# Patient Record
Sex: FEMALE | Race: WHITE | NOT HISPANIC OR LATINO | Employment: OTHER | URBAN - METROPOLITAN AREA
[De-identification: names, ages, dates, MRNs, and addresses within clinical notes are randomized per-mention and may not be internally consistent; named-entity substitution may affect disease eponyms.]

---

## 2017-05-15 DIAGNOSIS — N64.89 OTHER SPECIFIED DISORDERS OF BREAST: ICD-10-CM

## 2017-06-08 ENCOUNTER — HOSPITAL ENCOUNTER (OUTPATIENT)
Dept: ULTRASOUND IMAGING | Facility: CLINIC | Age: 52
Discharge: HOME | End: 2017-06-08
Payer: COMMERCIAL

## 2017-06-08 ENCOUNTER — HOSPITAL ENCOUNTER (OUTPATIENT)
Dept: MAMMOGRAPHY | Facility: CLINIC | Age: 52
Discharge: HOME | End: 2017-06-08
Payer: COMMERCIAL

## 2017-06-08 DIAGNOSIS — N64.89 OTHER SPECIFIED DISORDERS OF BREAST: ICD-10-CM

## 2017-06-08 PROCEDURE — 77063 BREAST TOMOSYNTHESIS BI: CPT

## 2017-06-08 PROCEDURE — G0202 SCR MAMMO BI INCL CAD: HCPCS

## 2017-06-08 PROCEDURE — 76642 ULTRASOUND BREAST LIMITED: CPT

## 2017-06-11 ENCOUNTER — GENERIC CONVERSION - ENCOUNTER (OUTPATIENT)
Dept: OTHER | Facility: OTHER | Age: 52
End: 2017-06-11

## 2017-06-14 ENCOUNTER — ALLSCRIPTS OFFICE VISIT (OUTPATIENT)
Dept: OTHER | Facility: OTHER | Age: 52
End: 2017-06-14

## 2017-07-12 ENCOUNTER — GENERIC CONVERSION - ENCOUNTER (OUTPATIENT)
Dept: OTHER | Facility: OTHER | Age: 52
End: 2017-07-12

## 2017-07-18 DIAGNOSIS — N83.202 CYST OF LEFT OVARY: ICD-10-CM

## 2017-07-19 LAB — PLEASE NOTE (HISTORICAL): NORMAL

## 2017-07-20 ENCOUNTER — GENERIC CONVERSION - ENCOUNTER (OUTPATIENT)
Dept: OTHER | Facility: OTHER | Age: 52
End: 2017-07-20

## 2017-07-20 LAB — FECAL OCCULT BLOOD DIAGNOSTIC (HISTORICAL): NEGATIVE

## 2017-09-12 ENCOUNTER — ALLSCRIPTS OFFICE VISIT (OUTPATIENT)
Dept: OTHER | Facility: OTHER | Age: 52
End: 2017-09-12

## 2017-09-12 ENCOUNTER — LAB REQUISITION (OUTPATIENT)
Dept: LAB | Facility: HOSPITAL | Age: 52
End: 2017-09-12
Payer: COMMERCIAL

## 2017-09-12 DIAGNOSIS — Z01.419 ENCOUNTER FOR GYNECOLOGICAL EXAMINATION WITHOUT ABNORMAL FINDING: ICD-10-CM

## 2017-09-12 PROCEDURE — G0145 SCR C/V CYTO,THINLAYER,RESCR: HCPCS | Performed by: OBSTETRICS & GYNECOLOGY

## 2017-09-12 PROCEDURE — 87624 HPV HI-RISK TYP POOLED RSLT: CPT | Performed by: OBSTETRICS & GYNECOLOGY

## 2017-09-18 LAB — HPV RRNA GENITAL QL NAA+PROBE: NORMAL

## 2017-09-20 LAB
LAB AP GYN PRIMARY INTERPRETATION: NORMAL
Lab: NORMAL

## 2017-10-26 NOTE — PROGRESS NOTES
Assessment  1  Encounter for annual routine gynecological examination (V72 31) (Z37 570)    Discussion/Summary    #1  Pap smear done with HPV  Patient will call for results in one weekEncouraged self breast examination as well as calcium supplementationContinue annual mammogram, 3-D as well as right breast ultrasound in one year  Patient will check cc of 3-D component is covered by her insuranceReturn to office in one year  The patient has the current Goals: Goals met  The patent has the current Barriers: No barriers  Patient is able to Self-Care  Self Referrals: Yes      Chief Complaint  yearly      History of Present Illness  HPI: This is a 64-year-old female  who presents for her annual GYN exam  She went through menopause at age 51  She denies any vaginal bleeding or spotting  She denies any hot flashes or night sweats  She was never on hormone replacement therapy  She did have an abnormal Pap smear, GIGI and subsequently underwent a D&C hysteroscopy in 2016  Workup was negative  Patient denies any vaginal bleeding or spotting patient is sexually active and has been in a monogamous relationship for 27 years  is up-to-date with her screening mammogram with recommendations to have follow-up mammogram one year with breast ultrasound  She's never had a screening colonoscopy          Review of Systems    Cardiovascular: no complaints of slow or fast heart rate, no chest pain, no palpitations, no leg claudication or lower extremity edema  Respiratory: no complaints of shortness of breath, no wheezing, no dyspnea on exertion, no orthopnea or PND  Breasts: no complaints of breast pain, breast lump or nipple discharge  Gastrointestinal: no complaints of abdominal pain, no constipation, no nausea or diarrhea, no vomiting, no bloody stools  Genitourinary: no complaints of dysuria, no incontinence, no pelvic pain, no dysmenorrhea, no vaginal discharge or abnormal vaginal bleeding       Over the past 2 weeks, how often have you been bothered by the following problems? 1 ) Little interest or pleasure in doing things? Not at all    2 ) Feeling down, depressed or hopeless? Not at all    3 ) Trouble falling asleep or sleeping too much? Not at all    4 ) Feeling tired or having little energy? Not at all    5 ) Poor appetite or overeating? Not at all    6 ) Feeling bad about yourself, or that you are a failure, or have let yourself or your family down? Not at all    7 ) Trouble concentrating on things, such as reading a newspaper or watching television? Not at all    8 ) Moving or speaking so slowly that other people could have noticed, or the opposite, moving or speaking faster than usual? Not at all    9 ) Thoughts that you would be better off dead or of hurting yourself in some way? Not at all  Score 0     ROS reviewed  OB History  Pregnancy History (Brief):   Prior pregnancies: : 3  Para:   Delivery type: 2 vaginal,-- 1  section       Active Problems  1  Breast asymmetry (611 89) (N64 89)   2  Breast density (611 79) (R92 2)   3  Encounter for annual routine gynecological examination (V72 31) (Z01 419)   4  Encounter for screening mammogram for malignant neoplasm of breast (V76 12)   (Z12 31)   5  Ovarian cyst, left (620 2) (N83 202)   6  Tick bite, initial encounter (919 4,E906 4) (W57 XXXA)    Past Medical History   · History of Fracture Of The Humerus (812 20)   · History of Fracture Of The Nasal Bones (802 0)   · History of metrorrhagia (V13 29) (Z87 42)   · History of Neuritis (729 2) (M79 2)   · History of Subserous leiomyoma of uterus (218 2) (D25 2)    The active problems and past medical history were reviewed and updated today  Surgical History   · History of  Section    The surgical history was reviewed and updated today         Family History  Maternal Grandfather    · Family history of Neoplasm Of The Bone Marrow  Paternal Grandfather    · Family history of Colon Cancer (V16 0)    The family history was reviewed and updated today  Social History   · Being A Social Drinker   · Daily Coffee Consumption (3  Cups/Day)   · Never a smoker  The social history was reviewed and updated today  Current Meds   1  No Reported Medications Recorded    Allergies  1  No Known Drug Allergies    Vitals   Recorded: 44UTX4249 08:91VA   Systolic 862   Diastolic 70   Height 5 ft 4 5 in   Weight 154 lb    BMI Calculated 26 03   BSA Calculated 1 76   LMP postmenopausal     Physical Exam    Constitutional   General appearance: No acute distress, well appearing and well nourished  Pulmonary   Auscultation of lungs: Clear to auscultation  Cardiovascular   Auscultation of heart: Normal rate and rhythm, normal S1 and S2, no murmurs  Genitourinary   External genitalia: Normal and no lesions appreciated  Vagina: Abnormal   Vagina: atrophy  Urethral meatus: Normal     Cervix: Normal, no palpable masses  Uterus: Normal, non-tender, not enlarged, and no palpable masses  Adnexa/parametria: Normal, non-tender and no fullness or masses appreciated  Anus, perineum, and rectum: Normal sphincter tone, no masses, and no prolapse  Chest   Breasts: Normal and no dimpling or skin changes noted  Abdomen   Abdomen: Normal, non-tender, and no organomegaly noted         Signatures   Electronically signed by : Joanne Velasquez DO; Sep 12 2017  4:01PM EST                       (Author)

## 2018-01-11 NOTE — RESULT NOTES
Verified Results  [address] [facility] & CAD 08Jun2017 02:44PM Ronald Beltran Order Number: [id number]    - Patient Instructions: To schedule this appointment, please contact Central Scheduling at [phone]  Do not wear any perfume, powder, lotion or deodorant on breast or underarm area  Please bring your doctors order, referral (if needed) and insurance information with you on the day of the test  Failure to bring this information may result in this test being rescheduled  Arrive 15 minutes prior to your appointment time to register  On the day of your test, please bring any prior mammogram or breast studies with you that were not performed at a [facility]  Failure to bring prior exams may result in your test needing to be rescheduled  Test Name Result Flag Reference   MAMMO SCREENING BILATERAL W 3D & CAD (Report)     Patient History:   Patient is postmenopausal    Family history of colorectal cancer at age 79 in paternal    grandfather  Patient is a former smoker, and smoked for 10 years  Patient's    BMI is 25 1  Reason for exam: screening, asymptomatic  Mammo Screening Bilateral W DBT and CAD: June 8, 2017 - Check In    #: [de-identified]   2D/3D Procedure   3D views: Bilateral MLO view(s) were taken  2D views: Bilateral CC view(s) were taken  Technologist: MARLEY Ornelas (R)(M)   Prior study comparison: June 6, 2016, mammo diagnostic right W    CAD performed at [address] [place] [address]  June 6, 2016, US breast right limited performed at [address] [place] [address]  Cassia 3, 2016, mammo screening bilateral W CAD,    performed at [address] [facility]  March 11, 2014, bilateral OPMA digital scrn mammo w/CAD, performed at    [address] [facility]  US Breast Right Limited: June 8, 2017 - Check In #: [de-identified]   Standard views       Technologist: RT Bree (R), RDMS   FINDINGS: 3-D tomographic MLO and 2-D cc images of both breasts    were obtained  The partially obscured oval tissue density seen    previously in the outer anterior RIGHT cc view is present but    much less apparent than on the prior studies  No discrete    abnormality is identified on the tomographic images through this    region  There is no evidence for dominant mass, suspicious    microcalcification or distortion  Remainder of the breast is    otherwise stable  Targeted ultrasound of RIGHT 10-11 o'clock region was performed    with a high frequency linear transducer  The 2 previously    described nodular areas are again identified, remaining present    and overall stable in appearance and size compared to the prior  Specifically, 11:00 2 cm position 7 x 4 x 6 mm and between 10    and 11 o'clock position 2 cm from the nipple measuring 6 x 4 x 5    mm  On volumetric imaging these have an appearance more    suggestive of a cyst cluster  CONCLUSION: Stable probably benign findings as detailed above  Recommend repeat ultrasound in one year at the time of bilateral    mammography  ACR BI-RADSï¾® Assessments: BiRad:3 - Probably Benign (Overall)   3-D Scrn Mammo: BiRad:2 - benign finding in both breasts  Right breast Right Brst US: BiRad:3 - probably benign finding in    the right breast      Recommendation:   Mammogram of both breasts in 1 year  Ultrasound of the right breast in 12 months       Transcription Location: [facility] 98: [id number]     Risk Value(s):   Tyrer-Cuzick 10 Year: 2 300%, Tyrer-Cuzick Lifetime: 10 000%,    Myriad Table: 1 5%, SKY 5 Year: 1 3%, NCI Lifetime: 12 1%

## 2018-01-13 NOTE — RESULT NOTES
Verified Results  *US BREAST RIGHT LIMITED (DIAGNOSTIC) [id number] 02:44PM Butch Nagy Order Number: [id number]   Performing Comments: was due for 6 month f/u (R) breast asymmetry 12/2016   - Patient Instructions: To schedule this appointment, please contact Central Scheduling at [phone]  Test Name Result Flag Reference   US BREAST RIGHT LIMITED (Report)     Patient History:   Patient is postmenopausal    Family history of colorectal cancer at age 79 in paternal    grandfather  Patient is a former smoker, and smoked for 10 years  Patient's    BMI is 25 1  Reason for exam: screening, asymptomatic  Mammo Screening Bilateral W DBT and CAD: June 8, 2017 - Check In    #: [de-identified]   2D/3D Procedure   3D views: Bilateral MLO view(s) were taken  2D views: Bilateral CC view(s) were taken  Technologist: MARLEY Stratton Ala (R)(M)   Prior study comparison: June 6, 2016, mammo diagnostic right W    CAD performed at [address] [place] [address]  June 6, 2016, US breast right limited performed at [address] [place] [address]  Cassia 3, 2016, mammo screening bilateral W CAD,    performed at [address] [facility]  March 11, 2014, bilateral OPMA digital scrn mammo w/CAD, performed at    [address] [facility]  US Breast Right Limited: June 8, 2017 - Check In #: [de-identified]   Standard views  Technologist: RT Davi (R), HARSHA   FINDINGS: 3-D tomographic MLO and 2-D cc images of both breasts    were obtained  The partially obscured oval tissue density seen    previously in the outer anterior RIGHT cc view is present but    much less apparent than on the prior studies  No discrete    abnormality is identified on the tomographic images through this    region  There is no evidence for dominant mass, suspicious    microcalcification or distortion  Remainder of the breast is    otherwise stable       Targeted ultrasound of RIGHT 10-11 o'clock region was performed    with a high frequency linear transducer  The 2 previously    described nodular areas are again identified, remaining present    and overall stable in appearance and size compared to the prior  Specifically, 11:00 2 cm position 7 x 4 x 6 mm and between 10    and 11 o'clock position 2 cm from the nipple measuring 6 x 4 x 5    mm  On volumetric imaging these have an appearance more    suggestive of a cyst cluster  CONCLUSION: Stable probably benign findings as detailed above  Recommend repeat ultrasound in one year at the time of bilateral    mammography  ACR BI-RADSï¾® Assessments: BiRad:3 - Probably Benign (Overall)   3-D Scrn Mammo: BiRad:2 - benign finding in both breasts  Right breast Right Brst US: BiRad:3 - probably benign finding in    the right breast      Recommendation:   Mammogram of both breasts in 1 year  Ultrasound of the right breast in 12 months       Transcription Location: [facility] 98: [id number]     Risk Value(s):   Tyrer-Cuzick 10 Year: 2 300%, Tyrer-Cuzick Lifetime: 10 000%,    Myriad Table: 1 5%, SKY 5 Year: 1 3%, NCI Lifetime: 12 1%

## 2018-01-14 VITALS
OXYGEN SATURATION: 98 % | BODY MASS INDEX: 25.7 KG/M2 | HEART RATE: 64 BPM | DIASTOLIC BLOOD PRESSURE: 66 MMHG | TEMPERATURE: 97.6 F | SYSTOLIC BLOOD PRESSURE: 108 MMHG | HEIGHT: 65 IN | WEIGHT: 154.25 LBS

## 2018-01-15 VITALS
DIASTOLIC BLOOD PRESSURE: 70 MMHG | HEIGHT: 65 IN | SYSTOLIC BLOOD PRESSURE: 110 MMHG | WEIGHT: 154 LBS | BODY MASS INDEX: 25.66 KG/M2

## 2018-01-17 NOTE — RESULT NOTES
Discussion/Summary   Please let the patient know that her stool test was negative  She will need to repeat this in one year for colon cancer screening purposes       Verified Results  (LC) Occult Blood, Fecal, [place] [id number] 12:00AM Latrell Jasmine     Test Name Result Flag Reference   Occult Blood, Fecal, [place] Negative  Negative

## 2018-01-23 NOTE — PROGRESS NOTES
Assessment   1  Encounter for preventive health examination (V70 0) (Z00 00)  2  Encounter for PPD test (V74 1) (Z11 1)  3  Screening for colon cancer (V76 51) (Z12 11)  4  Screening for tuberculosis (V74 1) (Z11 1)  5  Never a smoker    Plan  Health Maintenance    · Always use a seat belt and shoulder strap when riding or driving a motor vehicle ;  Status:Complete;   Done: 23MVB4139   · Brush your teeth 3 times a day and floss at least once a day ; Status:Complete;   Done:  45TWP3102   · Drink plenty of fluids ; Status:Complete;   Done: 30MRV8647   · Eat foods that are high in calcium ; Status:Complete;   Done: 18VSR5113   · Schedule an appointment in 48-72 hours to have your TB (tuberculin) skin test  interpreted by a trained healthcare provider ; Status:Complete;   Done: 65HAV3906   · Use a sun block product with an SPF of 15 or more ; Status:Complete;   Done:  39SEV4632   · We encourage all of our patients to exercise regularly  30 minutes of exercise or physical  activity five or more days a week is recommended for children and adults ;  Status:Complete;   Done: 75TZQ7280   · We recommend routine visits to a dentist ; Status:Complete;   Done: 41UEG8323   · We recommend that you follow the "Mediterranean diet "; Status:Complete;   Done:  13XJP0147   · Call [phone] if: You find a new or different kind of lump in your breast ;  Status:Complete;   Done: 27XMQ5886   · Call [phone] if: You have any warning signs of skin cancer ; Status:Complete;    Done: 22ETQ5888  Screening for colon cancer    · COLONOSCOPY; Status:Active; Requested for:30Nov2016;   Screening for tuberculosis    · Administered: Tubersol 5 UNIT/0 1ML Intradermal Solution    Discussion/Summary  health maintenance visit healthy adult female Currently, she eats a healthy diet and has an adequate exercise regimen  cervical cancer screening is current Breast cancer screening: mammogram is current   Colorectal cancer screening: the next colonoscopy is due november 2016 and colorectal cancer screening is not indicated  Osteoporosis screening: bone mineral density testing is not indicated  The immunizations are up to date  Patient discussion: discussed with the patient  1) ppd done today, check on monday  2) keep appt for gyn evaluation   3) colonoscopy due at age 49  Chief Complaint  53YEAR OLD FEMALE PATIENT FOR A WORK PE  SHE FORGOT HER FORM TODAY  HER SON IS GOING TO DRIVE THIS OVER TODAY  EYE EXAM COMPLETE  PATIENT TAKES NO MEDICATIONS  ADACEL, BW AND MAMMO UP TO DATE  PATIENT IS SCHEDULED WITH DR Audrey Claire TO HAVE A D/C ON 0812/16  SHE WILL HAVE HER FORWARD SURGICAL RECORDS  History of Present Illness  HM, Adult Female: The patient is being seen for a health maintenance evaluation  The last health maintenance visit was 2 year(s) ago  General Health: The patient's health since the last visit is described as good  She has regular dental visits  She denies vision problems  She denies hearing loss  Immunizations status: up to date  Lifestyle:  She consumes a diverse and healthy diet  She does not have any weight concerns  She exercises regularly  She does not use tobacco  She consumes alcohol  She denies drug use  Reproductive health: the patient is perimenopausal    Screening: cancer screening reviewed and updated  metabolic screening reviewed and updated  risk screening reviewed and updated  HPI: 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[employer]  She recently spent 6 weeks in [place] she will need a TB test today  She denies any recent illnesses  Review of Systems    Constitutional: No fever, no chills, feels well, no tiredness, no recent weight gain or weight loss  Eyes: No complaints of eye pain, no red eyes, no eyesight problems, no discharge, no dry eyes, no itching of eyes     ENT: no complaints of earache, no loss of hearing, no nose bleeds, no nasal discharge, no sore throat, no hoarseness  Cardiovascular: No complaints of slow heart rate, no fast heart rate, no chest pain, no palpitations, no leg claudication, no lower extremity edema  Respiratory: No complaints of shortness of breath, no wheezing, no cough, no SOB on exertion, no orthopnea, no PND  Gastrointestinal: No complaints of abdominal pain, no constipation, no nausea or vomiting, no diarrhea, no bloody stools  Genitourinary: No complaints of dysuria, no incontinence, no pelvic pain, no dysmenorrhea, no vaginal discharge or bleeding  Musculoskeletal: No complaints of arthralgias, no myalgias, no joint swelling or stiffness, no limb pain or swelling  Integumentary: No complaints of skin rash or lesions, no itching, no skin wounds, no breast pain or lump  Neurological: No complaints of headache, no confusion, no convulsions, no numbness, no dizziness or fainting, no tingling, no limb weakness, no difficulty walking  Psychiatric: Not suicidal, no sleep disturbance, no anxiety or depression, no change in personality, no emotional problems  Endocrine: No complaints of proptosis, no hot flashes, no muscle weakness, no deepening of the voice, no feelings of weakness  Hematologic/Lymphatic: No complaints of swollen glands, no swollen glands in the neck, does not bleed easily, does not bruise easily  Active Problems   1  Anemia (285 9) (D64 9)  2  Encounter for PPD test (V74 1) (Z11 1)  3  Encounter for screening mammogram for malignant neoplasm of breast (V76 12)   (Z12 31)  4  Migraine headache (346 90) (G43 909)  5  Need for immunization against influenza (V04 81) (Z23)  6  Screening for diabetes mellitus (DM) (V77 1) (Z13 1)  7   Screening for lipoid disorders (V77 91) (Z13 220)    Past Medical History    · History of Conjunctivitis (372 30) (H10 9)   · History of Fracture Of The Humerus (812 20)   · History of Fracture Of The Nasal Bones (802 0)   · History of metrorrhagia (V13 29) (Z87 42)   · History of Neuritis (729 2) (M79 2)   · History of Vaginal candidiasis (112 1) (B37 3)    Surgical History    · History of  Section    Family History  Maternal Grandfather    · Family history of Neoplasm Of The Bone Marrow  Paternal Grandfather    · Family history of Colon Cancer (V16 0)    Social History    · Being A Social Drinker   · Daily Coffee Consumption (3  Cups/Day)   · Never a smoker    Allergies   1  No Known Drug Allergies    Vitals   Recorded: 76IJJ4664 16:41MU   Systolic 171   Diastolic 64   Heart Rate 66   Temperature 96 5 F   O2 Saturation 99   Height 5 ft 4 5 in   Weight 145 lb 4 00 oz   BMI Calculated 24 55   BSA Calculated 1 72     Physical Exam    Constitutional   General appearance: No acute distress, well appearing and well nourished  Head and Face   Head and face: Normal     Palpation of the face and sinuses: No sinus tenderness  Eyes   Conjunctiva and lids: No swelling, erythema or discharge  Pupils and irises: Equal, round, reactive to light  Ears, Nose, Mouth, and Throat   External inspection of ears and nose: Normal     Otoscopic examination: Tympanic membranes translucent with normal light reflex  Canals patent without erythema  Hearing: Normal     Nasal mucosa, septum, and turbinates: Normal without edema or erythema  Lips, teeth, and gums: Normal, good dentition  Oropharynx: Normal with no erythema, edema, exudate or lesions  Neck   Neck: Supple, symmetric, trachea midline, no masses  Thyroid: Normal, no thyromegaly  Pulmonary   Respiratory effort: No increased work of breathing or signs of respiratory distress  Auscultation of lungs: Clear to auscultation  Cardiovascular   Auscultation of heart: Normal rate and rhythm, normal S1 and S2, no murmurs  Examination of extremities for edema and/or varicosities: Normal     Abdomen   Abdomen: Non-tender, no masses  Liver and spleen: No hepatomegaly or splenomegaly      Lymphatic   Palpation of lymph nodes in neck: No lymphadenopathy  Musculoskeletal   Gait and station: Normal     Digits and nails: Normal without clubbing or cyanosis  Joints, bones, and muscles: Normal     Range of motion: Normal     Stability: Normal     Muscle strength/tone: Normal     Skin   Skin and subcutaneous tissue: Normal without rashes or lesions  Palpation of skin and subcutaneous tissue: Normal turgor  Neurologic   Cortical function: Normal mental status  Coordination: Normal finger to nose and heel to shin  Psychiatric   Judgment and insight: Normal     Orientation to person, place, and time: Normal     Recent and remote memory: Intact  Mood and affect: Normal        Procedure    Procedure:1  Visual Acuity Test1   Inforrmation supplied by 1  a Snellen chart1   Results:1  20/20 in both eyes without corrective device1 , 20/20 in the right eye without corrective device1 , 20/20 in the left eye without corrective device1        1 Amended By: Luca Adames;  Aug 08 2016 11:37 AM EST    Future Appointments    Date/Time Provider Specialty Site   08/12/2016 07:30 AM Chidi Elise DO Obstetrics/Gynecology [facility] [place]     Signatures   Electronically signed by : Joel Bills DO; Aug  8 2016  1:48PM EST                       (Author)

## 2018-06-08 DIAGNOSIS — R92.2 INCONCLUSIVE MAMMOGRAM: ICD-10-CM

## 2018-06-08 DIAGNOSIS — Z12.31 ENCOUNTER FOR SCREENING MAMMOGRAM FOR MALIGNANT NEOPLASM OF BREAST: ICD-10-CM

## 2019-06-17 ENCOUNTER — ANNUAL EXAM (OUTPATIENT)
Dept: OBGYN CLINIC | Facility: CLINIC | Age: 54
End: 2019-06-17
Payer: COMMERCIAL

## 2019-06-17 VITALS
DIASTOLIC BLOOD PRESSURE: 80 MMHG | HEIGHT: 64 IN | BODY MASS INDEX: 25.57 KG/M2 | WEIGHT: 149.8 LBS | SYSTOLIC BLOOD PRESSURE: 114 MMHG

## 2019-06-17 DIAGNOSIS — Z12.39 BREAST CANCER SCREENING: ICD-10-CM

## 2019-06-17 DIAGNOSIS — Z01.419 ENCOUNTER FOR ANNUAL ROUTINE GYNECOLOGICAL EXAMINATION: Primary | ICD-10-CM

## 2019-06-17 PROCEDURE — 87624 HPV HI-RISK TYP POOLED RSLT: CPT | Performed by: OBSTETRICS & GYNECOLOGY

## 2019-06-17 PROCEDURE — G0145 SCR C/V CYTO,THINLAYER,RESCR: HCPCS | Performed by: OBSTETRICS & GYNECOLOGY

## 2019-06-17 PROCEDURE — S0612 ANNUAL GYNECOLOGICAL EXAMINA: HCPCS | Performed by: OBSTETRICS & GYNECOLOGY

## 2019-06-19 ENCOUNTER — HOSPITAL ENCOUNTER (OUTPATIENT)
Dept: MAMMOGRAPHY | Facility: CLINIC | Age: 54
Discharge: HOME | End: 2019-06-19
Payer: COMMERCIAL

## 2019-06-19 VITALS — WEIGHT: 150 LBS | HEIGHT: 64 IN | BODY MASS INDEX: 25.61 KG/M2

## 2019-06-19 DIAGNOSIS — Z12.39 BREAST CANCER SCREENING: ICD-10-CM

## 2019-06-19 PROCEDURE — 77063 BREAST TOMOSYNTHESIS BI: CPT

## 2019-06-19 PROCEDURE — 77067 SCR MAMMO BI INCL CAD: CPT

## 2019-06-20 LAB
HPV HR 12 DNA CVX QL NAA+PROBE: NEGATIVE
HPV16 DNA CVX QL NAA+PROBE: NEGATIVE
HPV18 DNA CVX QL NAA+PROBE: NEGATIVE

## 2019-06-21 LAB
LAB AP GYN PRIMARY INTERPRETATION: NORMAL
Lab: NORMAL

## 2019-10-30 ENCOUNTER — TELEPHONE (OUTPATIENT)
Dept: FAMILY MEDICINE CLINIC | Facility: CLINIC | Age: 54
End: 2019-10-30

## 2020-01-02 ENCOUNTER — TELEPHONE (OUTPATIENT)
Dept: FAMILY MEDICINE CLINIC | Facility: CLINIC | Age: 55
End: 2020-01-02

## 2020-10-28 ENCOUNTER — OFFICE VISIT (OUTPATIENT)
Dept: FAMILY MEDICINE CLINIC | Facility: CLINIC | Age: 55
End: 2020-10-28
Payer: COMMERCIAL

## 2020-10-28 VITALS
HEIGHT: 66 IN | OXYGEN SATURATION: 98 % | DIASTOLIC BLOOD PRESSURE: 82 MMHG | BODY MASS INDEX: 23.63 KG/M2 | SYSTOLIC BLOOD PRESSURE: 120 MMHG | HEART RATE: 66 BPM | WEIGHT: 147 LBS | TEMPERATURE: 97.6 F

## 2020-10-28 DIAGNOSIS — Z12.11 SCREENING FOR COLORECTAL CANCER: ICD-10-CM

## 2020-10-28 DIAGNOSIS — Z13.0 SCREENING FOR ENDOCRINE, METABOLIC AND IMMUNITY DISORDER: ICD-10-CM

## 2020-10-28 DIAGNOSIS — Z13.29 SCREENING FOR ENDOCRINE, METABOLIC AND IMMUNITY DISORDER: ICD-10-CM

## 2020-10-28 DIAGNOSIS — T14.8XXA HEMATOMA: ICD-10-CM

## 2020-10-28 DIAGNOSIS — Z13.29 SCREENING FOR THYROID DISORDER: ICD-10-CM

## 2020-10-28 DIAGNOSIS — Z13.0 SCREENING FOR IRON DEFICIENCY ANEMIA: ICD-10-CM

## 2020-10-28 DIAGNOSIS — Z13.228 SCREENING FOR ENDOCRINE, METABOLIC AND IMMUNITY DISORDER: ICD-10-CM

## 2020-10-28 DIAGNOSIS — C44.320 SQUAMOUS CELL CARCINOMA, FACE: ICD-10-CM

## 2020-10-28 DIAGNOSIS — Z12.31 ENCOUNTER FOR SCREENING MAMMOGRAM FOR MALIGNANT NEOPLASM OF BREAST: ICD-10-CM

## 2020-10-28 DIAGNOSIS — Z12.12 SCREENING FOR COLORECTAL CANCER: ICD-10-CM

## 2020-10-28 DIAGNOSIS — Z12.11 SCREEN FOR COLON CANCER: ICD-10-CM
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
## 2020-10-28 DIAGNOSIS — Z13.220 SCREENING, LIPID: ICD-10-CM

## 2020-10-28 PROCEDURE — 99203 OFFICE O/P NEW LOW 30 MIN: CPT | Performed by: FAMILY MEDICINE

## 2020-10-28 PROCEDURE — 3008F BODY MASS INDEX DOCD: CPT | Performed by: FAMILY MEDICINE

## 2020-10-28 PROCEDURE — 3725F SCREEN DEPRESSION PERFORMED: CPT | Performed by: FAMILY MEDICINE

## 2020-10-28 RX ORDER — DOXYCYCLINE HYCLATE 100 MG/1
100 CAPSULE ORAL EVERY 12 HOURS SCHEDULED
Qty: 14 CAPSULE | Refills: 0 | Status: SHIPPED | OUTPATIENT
Start: 2020-10-28 | End: 2020-11-04

## 2021-03-26 DIAGNOSIS — Z23 ENCOUNTER FOR IMMUNIZATION: ICD-10-CM

## 2021-05-07 ENCOUNTER — VBI (OUTPATIENT)
Dept: ADMINISTRATIVE | Facility: OTHER | Age: 56
End: 2021-05-07

## 2021-08-04 ENCOUNTER — ANNUAL EXAM (OUTPATIENT)
Dept: OBGYN CLINIC | Facility: CLINIC | Age: 56
End: 2021-08-04
Payer: COMMERCIAL

## 2021-08-04 VITALS
WEIGHT: 149 LBS | HEIGHT: 66 IN | BODY MASS INDEX: 23.95 KG/M2 | SYSTOLIC BLOOD PRESSURE: 112 MMHG | DIASTOLIC BLOOD PRESSURE: 72 MMHG

## 2021-08-04 DIAGNOSIS — Z12.11 ENCOUNTER FOR SCREENING COLONOSCOPY: ICD-10-CM

## 2021-08-04 DIAGNOSIS — Z12.31 ENCOUNTER FOR SCREENING MAMMOGRAM FOR BREAST CANCER: ICD-10-CM

## 2021-08-04 DIAGNOSIS — Z01.419 ENCOUNTER FOR ANNUAL ROUTINE GYNECOLOGICAL EXAMINATION: Primary | ICD-10-CM

## 2021-08-04 PROCEDURE — S0612 ANNUAL GYNECOLOGICAL EXAMINA: HCPCS | Performed by: OBSTETRICS & GYNECOLOGY

## 2021-08-04 PROCEDURE — G0476 HPV COMBO ASSAY CA SCREEN: HCPCS | Performed by: OBSTETRICS & GYNECOLOGY

## 2021-08-04 PROCEDURE — G0145 SCR C/V CYTO,THINLAYER,RESCR: HCPCS | Performed by: OBSTETRICS & GYNECOLOGY

## 2021-08-04 NOTE — PROGRESS NOTES
Assessment/Plan:    Pap smear done for cytology, reflex HPV  Encouraged self-breast examination as well as calcium supplementation  Continue annual mammogram   Recommend colonoscopy  She will continue to follow-up with her primary care physician as scheduled  Return to office in 1 year or p r n  No problem-specific Assessment & Plan notes found for this encounter  Diagnoses and all orders for this visit:    Encounter for annual routine gynecological examination  -     Liquid-based pap, screening    Encounter for screening mammogram for breast cancer  -     Mammo screening bilateral w 3d & cad; Future    Encounter for screening colonoscopy  -     Ambulatory referral for colon cancer education; Future          Subjective:      Patient ID: Nury Wagoner is a 48 y o  female  HPI      this is a pleasant 15-year-old female  ( x1,  x2, age 23, 26, 26) presents for gyn exam   Patient went through menopause at age 51  She denies any vaginal bleeding or spotting  There has been no changes in bowel or bladder function  She has been in a monogamous relationship for over  30 years  She did have an abnormal Pap smear in 2016, workup negative  She does follow up with her family physician on a regular basis  She is due for recent blood work  She has never had a screening colonoscopy  She is also due for her annual mammogram     The following portions of the patient's history were reviewed and updated as appropriate: allergies, current medications, past family history, past medical history, past social history, past surgical history and problem list     Review of Systems   Constitutional: Negative for fatigue, fever and unexpected weight change  Respiratory: Negative for cough, chest tightness, shortness of breath and wheezing  Cardiovascular: Negative  Negative for chest pain and palpitations  Gastrointestinal: Negative    Negative for abdominal distention, abdominal pain, blood in stool, constipation, diarrhea, nausea and vomiting  Genitourinary: Negative  Negative for difficulty urinating, dyspareunia, dysuria, flank pain, frequency, genital sores, hematuria, pelvic pain, urgency, vaginal bleeding, vaginal discharge and vaginal pain  Skin: Negative for rash  Objective:      /72   Ht 5' 5 75" (1 67 m)   Wt 67 6 kg (149 lb)   BMI 24 23 kg/m²          Physical Exam  Constitutional:       Appearance: Normal appearance  She is well-developed  Cardiovascular:      Rate and Rhythm: Normal rate and regular rhythm  Pulmonary:      Effort: Pulmonary effort is normal       Breath sounds: Normal breath sounds  Chest:      Breasts:         Right: No inverted nipple, mass, nipple discharge, skin change or tenderness  Left: No inverted nipple, mass, nipple discharge, skin change or tenderness  Abdominal:      General: Bowel sounds are normal  There is no distension  Palpations: Abdomen is soft  Tenderness: There is no abdominal tenderness  There is no guarding or rebound  Genitourinary:     Labia:         Right: No rash, tenderness or lesion  Left: No rash, tenderness or lesion  Vagina: Normal  No signs of injury  No vaginal discharge or tenderness  Cervix: No cervical motion tenderness, discharge, friability, lesion, erythema or cervical bleeding  Uterus: Not enlarged and not tender  Adnexa:         Right: No mass, tenderness or fullness  Left: No mass, tenderness or fullness  Comments: External genitalia is within normal limits  The vagina is evident of estrogen deficiency  There is no pelvic floor prolapse  Rectovaginal exam is confirmatory  Neurological:      Mental Status: She is alert and oriented to person, place, and time     Psychiatric:         Behavior: Behavior normal

## 2021-08-05 ENCOUNTER — TELEPHONE (OUTPATIENT)
Dept: OTHER | Facility: OTHER | Age: 56
End: 2021-08-05

## 2021-08-05 NOTE — TELEPHONE ENCOUNTER
08/05/21  Screened by: Fred Denson    Referring Provider  norma    Pre- Screening: There is no height or weight on file to calculate BMI    bmi 24 23  Has patient been referred for a routine screening Colonoscopy? yes  Is the patient between 39-70 years old? yes      Previous Colonoscopy no   If yes:    Date:      Facility:      Reason:        SCHEDULING STAFF: If the patient is between 45yrs-49yrs, please advise patient to confirm benefits/coverage with their insurance company for a routine screening colonoscopy, some insurance carriers will only cover at [address] or older  If the patient is over 66years old, please schedule an office visit  Does the patient want to see a Gastroenterologist prior to their procedure OR are they having any GI symptoms? no    Has the patient been hospitalized or had abdominal surgery in the past 6 months? no    Does the patient use supplemental oxygen? no    Does the patient take Coumadin, Lovenox, Plavix, Elliquis, Xarelto, or other blood thinning medication? no    Has the patient had a stroke, cardiac event, or stent placed in the past year? no    SCHEDULING STAFF: If patient answers NO to above questions, then schedule procedure  If patient answers YES to above questions, then schedule office appointment  If patient is between 45yrs - 49yrs, please advise patient that we will have to confirm benefits & coverage with their insurance company for a routine screening colonoscopy

## 2021-08-05 NOTE — TELEPHONE ENCOUNTER
Patient called to get set up to schedule her for a colonoscopy that her primary care Dr recommended to have  Please give patient a call back to schedule an appointment  She prefers the [facility]

## 2021-08-10 LAB
LAB AP GYN PRIMARY INTERPRETATION: NORMAL
Lab: NORMAL

## 2021-08-19 ENCOUNTER — HOSPITAL ENCOUNTER (OUTPATIENT)
Dept: MAMMOGRAPHY | Facility: IMAGING CENTER | Age: 56
Discharge: HOME | End: 2021-08-19
Payer: COMMERCIAL

## 2021-08-19 VITALS — HEIGHT: 66 IN | BODY MASS INDEX: 23.95 KG/M2 | WEIGHT: 149 LBS

## 2021-08-19 DIAGNOSIS — Z12.31 ENCOUNTER FOR SCREENING MAMMOGRAM FOR BREAST CANCER: ICD-10-CM

## 2021-08-19 PROCEDURE — 77063 BREAST TOMOSYNTHESIS BI: CPT

## 2021-08-19 PROCEDURE — 77067 SCR MAMMO BI INCL CAD: CPT

## 2021-09-21 VITALS — BODY MASS INDEX: 25.61 KG/M2 | HEIGHT: 64 IN | WEIGHT: 150 LBS

## 2021-09-21 DIAGNOSIS — Z12.11 ENCOUNTER FOR SCREENING COLONOSCOPY: Primary | ICD-10-CM

## 2021-09-21 RX ORDER — SODIUM PICOSULFATE, MAGNESIUM OXIDE, AND ANHYDROUS CITRIC ACID 10; 3.5; 12 MG/160ML; G/160ML; G/160ML
LIQUID ORAL
Qty: 320 ML | Refills: 0 | Status: SHIPPED | OUTPATIENT
Start: 2021-09-21 | End: 2021-09-27 | Stop reason: HOSPADM

## 2021-09-27 ENCOUNTER — ANESTHESIA (OUTPATIENT)
Dept: GASTROENTEROLOGY | Facility: AMBULATORY SURGERY CENTER | Age: 56
End: 2021-09-27

## 2021-09-27 ENCOUNTER — HOSPITAL ENCOUNTER (OUTPATIENT)
Dept: GASTROENTEROLOGY | Facility: AMBULATORY SURGERY CENTER | Age: 56
Discharge: HOME | End: 2021-09-27
Payer: COMMERCIAL

## 2021-09-27 ENCOUNTER — ANESTHESIA EVENT (OUTPATIENT)
Dept: GASTROENTEROLOGY | Facility: AMBULATORY SURGERY CENTER | Age: 56
End: 2021-09-27

## 2021-09-27 VITALS
TEMPERATURE: 97.3 F | RESPIRATION RATE: 16 BRPM | OXYGEN SATURATION: 99 % | DIASTOLIC BLOOD PRESSURE: 69 MMHG | HEART RATE: 59 BPM | SYSTOLIC BLOOD PRESSURE: 106 MMHG

## 2021-09-27 DIAGNOSIS — Z12.11 SCREENING FOR COLON CANCER: ICD-10-CM

## 2021-09-27 PROCEDURE — 88305 TISSUE EXAM BY PATHOLOGIST: CPT | Performed by: PATHOLOGY

## 2021-09-27 PROCEDURE — 45385 COLONOSCOPY W/LESION REMOVAL: CPT | Performed by: INTERNAL MEDICINE

## 2021-09-27 PROCEDURE — 45380 COLONOSCOPY AND BIOPSY: CPT | Performed by: INTERNAL MEDICINE

## 2021-09-27 RX ORDER — LIDOCAINE HYDROCHLORIDE 10 MG/ML
INJECTION, SOLUTION EPIDURAL; INFILTRATION; INTRACAUDAL; PERINEURAL AS NEEDED
Status: DISCONTINUED | OUTPATIENT
Start: 2021-09-27 | End: 2021-09-27

## 2021-09-27 RX ORDER — PROPOFOL 10 MG/ML
INJECTION, EMULSION INTRAVENOUS AS NEEDED
Status: DISCONTINUED | OUTPATIENT
Start: 2021-09-27 | End: 2021-09-27

## 2021-09-27 RX ORDER — SODIUM CHLORIDE, SODIUM LACTATE, POTASSIUM CHLORIDE, CALCIUM CHLORIDE 600; 310; 30; 20 MG/100ML; MG/100ML; MG/100ML; MG/100ML
50 INJECTION, SOLUTION INTRAVENOUS CONTINUOUS
Status: DISCONTINUED | OUTPATIENT
Start: 2021-09-27 | End: 2021-09-27

## 2021-09-27 RX ADMIN — PROPOFOL 100 MG: 10 INJECTION, EMULSION INTRAVENOUS at 08:02

## 2021-09-27 RX ADMIN — PROPOFOL 30 MG: 10 INJECTION, EMULSION INTRAVENOUS at 08:09

## 2021-09-27 RX ADMIN — LIDOCAINE HYDROCHLORIDE 50 MG: 10 INJECTION, SOLUTION EPIDURAL; INFILTRATION; INTRACAUDAL; PERINEURAL at 08:02

## 2021-09-27 RX ADMIN — SODIUM CHLORIDE, SODIUM LACTATE, POTASSIUM CHLORIDE, CALCIUM CHLORIDE 50 ML/HR: 600; 310; 30; 20 INJECTION, SOLUTION INTRAVENOUS at 07:56

## 2021-09-27 RX ADMIN — PROPOFOL 40 MG: 10 INJECTION, EMULSION INTRAVENOUS at 08:06

## 2021-09-27 RX ADMIN — PROPOFOL 30 MG: 10 INJECTION, EMULSION INTRAVENOUS at 08:14

## 2021-09-27 NOTE — ANESTHESIA POSTPROCEDURE EVALUATION
Post-Op Assessment Note    CV Status:  Stable  Pain Score: 0    Pain management: adequate     Mental Status:  Alert and awake   Hydration Status:  Euvolemic   PONV Controlled:  Controlled   Airway Patency:  Patent      Post Op Vitals Reviewed: Yes      Staff: Anesthesiologist, CRNA         No complications documented      BP   107/68   Temp     Pulse  58   Resp   16   SpO2   98

## 2021-09-27 NOTE — ANESTHESIA PREPROCEDURE EVALUATION
Procedure:  COLONOSCOPY    Relevant Problems   ANESTHESIA (within normal limits)      CARDIO (within normal limits)      PULMONARY (within normal limits)   (-) URI (upper respiratory infection)        Physical Exam    Airway    Mallampati score: II  TM Distance: >3 FB  Neck ROM: full     Dental   No notable dental hx     Cardiovascular      Pulmonary      Other Findings        Anesthesia Plan  ASA Score- 1     Anesthesia Type- IV sedation with anesthesia with ASA Monitors  Additional Monitors:   Airway Plan:           Plan Factors-Exercise tolerance (METS): >4 METS  Chart reviewed  Existing labs reviewed  Patient summary reviewed  Patient is not a current smoker  Induction- intravenous  Postoperative Plan-     Informed Consent- Anesthetic plan and risks discussed with patient  I personally reviewed this patient with the CRNA  Discussed and agreed on the Anesthesia Plan with the CRNA  Lisette Bernard

## 2021-10-13 ENCOUNTER — CLINICAL SUPPORT (OUTPATIENT)
Dept: FAMILY MEDICINE CLINIC | Facility: CLINIC | Age: 56
End: 2021-10-13

## 2021-10-13 DIAGNOSIS — B34.9 VIRAL INFECTION, UNSPECIFIED: Primary | ICD-10-CM

## 2021-10-13 PROCEDURE — U0003 INFECTIOUS AGENT DETECTION BY NUCLEIC ACID (DNA OR RNA); SEVERE ACUTE RESPIRATORY SYNDROME CORONAVIRUS 2 (SARS-COV-2) (CORONAVIRUS DISEASE [COVID-19]), AMPLIFIED PROBE TECHNIQUE, MAKING USE OF HIGH THROUGHPUT TECHNOLOGIES AS DESCRIBED BY CMS-2020-01-R: HCPCS | Performed by: NURSE PRACTITIONER

## 2021-10-13 PROCEDURE — U0005 INFEC AGEN DETEC AMPLI PROBE: HCPCS | Performed by: NURSE PRACTITIONER

## 2021-10-14 LAB — SARS-COV-2 RNA RESP QL NAA+PROBE: NEGATIVE

## 2021-10-15 ENCOUNTER — TELEPHONE (OUTPATIENT)
Dept: FAMILY MEDICINE CLINIC | Facility: CLINIC | Age: 56
End: 2021-10-15

## 2021-10-26 ENCOUNTER — VBI (OUTPATIENT)
Dept: ADMINISTRATIVE | Facility: OTHER | Age: 56
End: 2021-10-26

## 2022-01-17 ENCOUNTER — TELEPHONE (OUTPATIENT)
Dept: FAMILY MEDICINE CLINIC | Facility: CLINIC | Age: 57
End: 2022-01-17

## 2022-01-17 NOTE — TELEPHONE ENCOUNTER
I would get a pcr  We can do one in the office in the am if she wants  If she develops more symptoms, the rapid may be positive     Fluids

## 2022-01-17 NOTE — TELEPHONE ENCOUNTER
Patient states she woke up this am with a sore throat  She is a  so she took a rapid test and it was negative  She has no other symptoms  What are you recommendations? Should she do another rapid, get a PCR or wait and see?     Please advise at [phone]

## 2022-01-17 NOTE — TELEPHONE ENCOUNTER
Patient wanted to know if she was ok to go to work as it is just a sore throat, no other sx, and she tested negative on a rapid test     I spoke with Dr Tito Giordano and she stated as long as she doesn't have a fever or a cough she should be ok for work

## 2022-04-28 ENCOUNTER — TELEPHONE (OUTPATIENT)
Dept: OBGYN CLINIC | Facility: CLINIC | Age: 57
End: 2022-04-28

## 2022-08-17 ENCOUNTER — OFFICE VISIT (OUTPATIENT)
Dept: FAMILY MEDICINE CLINIC | Facility: CLINIC | Age: 57
End: 2022-08-17
Payer: COMMERCIAL

## 2022-08-17 VITALS
DIASTOLIC BLOOD PRESSURE: 68 MMHG | HEIGHT: 66 IN | WEIGHT: 150 LBS | SYSTOLIC BLOOD PRESSURE: 118 MMHG | TEMPERATURE: 98.2 F | HEART RATE: 72 BPM | OXYGEN SATURATION: 99 % | BODY MASS INDEX: 24.11 KG/M2

## 2022-08-17 DIAGNOSIS — Z12.31 SCREENING MAMMOGRAM FOR BREAST CANCER: ICD-10-CM

## 2022-08-17 DIAGNOSIS — M79.672 HEEL PAIN, CHRONIC, LEFT: Primary | ICD-10-CM

## 2022-08-17 DIAGNOSIS — G89.29 HEEL PAIN, CHRONIC, LEFT: Primary | ICD-10-CM

## 2022-08-17 PROCEDURE — 99213 OFFICE O/P EST LOW 20 MIN: CPT | Performed by: NURSE PRACTITIONER

## 2022-08-17 PROCEDURE — 3725F SCREEN DEPRESSION PERFORMED: CPT | Performed by: NURSE PRACTITIONER

## 2022-08-17 NOTE — PROGRESS NOTES
Assessment/Plan:    Heel pain, chronic, left  Check xrays, ice, wear well fitting shoes         Diagnoses and all orders for this visit:    Heel pain, chronic, left  -     XR foot 3+ vw left; Future  -     XR heel / calcaneus 2+ vw left; Future    Screening mammogram for breast cancer  -     Mammo screening bilateral w 3d & cad; Future          Subjective:      Patient ID: Myke Escamilla is a 55 y o  female  20 years ago had left heel pain  Went away after years but helped with wearing good shoes  Never had any work up done  5 months ago start backed with the pain  Pain is getting worse  Getting close to unbearable pain  Pain is just localized doesn't radiate  Big hiker  No numbness or tingling, no swelling  Hasnt tried anything for it except hiking less  Tried advil takes edge off but nothing that lasts longer  The following portions of the patient's history were reviewed and updated as appropriate: allergies, current medications, past family history, past medical history, past social history, past surgical history and problem list     Review of Systems   Respiratory: Negative  Cardiovascular: Negative  Musculoskeletal: Positive for gait problem  Heel pain left   Skin: Positive for rash (poison ivy on lower legs b/l resolving)  Neurological: Negative for weakness and numbness  Hematological: Does not bruise/bleed easily  Objective:      /68   Pulse 72   Temp 98 2 °F (36 8 °C) (Tympanic)   Ht 5' 5 75" (1 67 m)   Wt 68 kg (150 lb)   SpO2 99%   BMI 24 40 kg/m²          Physical Exam  Constitutional:       Appearance: She is normal weight  HENT:      Head: Normocephalic  Cardiovascular:      Rate and Rhythm: Normal rate and regular rhythm  Pulses:           Dorsalis pedis pulses are 2+ on the right side and 2+ on the left side  Posterior tibial pulses are 2+ on the right side and 2+ on the left side  Heart sounds: Normal heart sounds     Pulmonary: Breath sounds: Normal breath sounds  No stridor  Musculoskeletal:      Right foot: Normal range of motion  No deformity, bunion, Charcot foot, foot drop or prominent metatarsal heads  Feet:    Feet:      Right foot:      Skin integrity: Skin integrity normal       Toenail Condition: Right toenails are normal       Left foot:      Skin integrity: Skin integrity normal       Toenail Condition: Left toenails are normal       Comments: Pain left heel pad, no tenderness along plantar fasciia, no swelling, no decreased rom  Skin:     General: Skin is warm and dry  Findings: Rash (resolving vesicular rash on bialteral anterior ankles) present  No bruising  Neurological:      Mental Status: She is alert

## 2022-08-17 NOTE — PATIENT INSTRUCTIONS
Check xray left foot/heel  Ice area- freeze water bottle and massage under left foot for 20 minutes 2-3x daily  Gentle stretching- draw abcs, and use towel for deep stretches  Wear proper supporting shoes

## 2022-08-18 ENCOUNTER — HOSPITAL ENCOUNTER (OUTPATIENT)
Dept: RADIOLOGY | Facility: HOSPITAL | Age: 57
Discharge: HOME | End: 2022-08-18
Payer: COMMERCIAL

## 2022-08-18 DIAGNOSIS — M79.672 HEEL PAIN, CHRONIC, LEFT: ICD-10-CM

## 2022-08-18 DIAGNOSIS — G89.29 HEEL PAIN, CHRONIC, LEFT: ICD-10-CM

## 2022-08-18 PROCEDURE — 73630 X-RAY EXAM OF FOOT: CPT

## 2022-08-18 PROCEDURE — 73650 X-RAY EXAM OF HEEL: CPT

## 2022-08-24 ENCOUNTER — TELEPHONE (OUTPATIENT)
Dept: FAMILY MEDICINE CLINIC | Facility: CLINIC | Age: 57
End: 2022-08-24

## 2022-08-24 NOTE — RESULT ENCOUNTER NOTE
Please let patient know her xray showed a heel spur  She should continue ice, taking anti-inflammatory   She can see a podiatrist or orthopedist who specializes in feet/ankles if her pain persists or worsens

## 2022-08-24 NOTE — TELEPHONE ENCOUNTER
----- Message from Meghan Stanley, [address] sent at 8/24/2022  7:47 AM EDT -----  Please let patient know her xray showed a heel spur  She should continue ice, taking anti-inflammatory   She can see a podiatrist or orthopedist who specializes in feet/ankles if her pain persists or worsens

## 2022-09-07 ENCOUNTER — TELEPHONE (OUTPATIENT)
Dept: OBGYN CLINIC | Facility: HOSPITAL | Age: 57
End: 2022-09-07

## 2022-09-07 NOTE — TELEPHONE ENCOUNTER
Patient called in regards to her appointment on 9/14  This was with Podiatry  Patient was transferred to reschedule appointment

## 2022-09-26 ENCOUNTER — OFFICE VISIT (OUTPATIENT)
Dept: PODIATRY | Facility: CLINIC | Age: 57
End: 2022-09-26
Payer: COMMERCIAL

## 2022-09-26 VITALS
HEART RATE: 69 BPM | HEIGHT: 66 IN | BODY MASS INDEX: 23.85 KG/M2 | DIASTOLIC BLOOD PRESSURE: 79 MMHG | SYSTOLIC BLOOD PRESSURE: 113 MMHG | WEIGHT: 148.4 LBS

## 2022-09-26 DIAGNOSIS — M72.2 PLANTAR FASCIITIS: Primary | ICD-10-CM

## 2022-09-26 DIAGNOSIS — M77.32 CALCANEAL SPUR, LEFT: ICD-10-CM

## 2022-09-26 DIAGNOSIS — M79.672 LEFT FOOT PAIN: ICD-10-CM

## 2022-09-26 PROCEDURE — 20550 NJX 1 TENDON SHEATH/LIGAMENT: CPT | Performed by: PODIATRIST

## 2022-09-26 PROCEDURE — 99203 OFFICE O/P NEW LOW 30 MIN: CPT | Performed by: PODIATRIST

## 2022-09-26 RX ORDER — LIDOCAINE HYDROCHLORIDE 10 MG/ML
2 INJECTION, SOLUTION EPIDURAL; INFILTRATION; INTRACAUDAL; PERINEURAL ONCE
Status: COMPLETED | OUTPATIENT
Start: 2022-09-26 | End: 2022-09-26

## 2022-09-26 RX ORDER — TRIAMCINOLONE ACETONIDE 40 MG/ML
20 INJECTION, SUSPENSION INTRA-ARTICULAR; INTRAMUSCULAR ONCE
Status: COMPLETED | OUTPATIENT
Start: 2022-09-26 | End: 2022-09-26

## 2022-09-26 RX ADMIN — LIDOCAINE HYDROCHLORIDE 2 ML: 10 INJECTION, SOLUTION EPIDURAL; INFILTRATION; INTRACAUDAL; PERINEURAL at 14:56

## 2022-09-26 RX ADMIN — TRIAMCINOLONE ACETONIDE 20 MG: 40 INJECTION, SUSPENSION INTRA-ARTICULAR; INTRAMUSCULAR at 14:56

## 2022-09-26 NOTE — PROGRESS NOTES
PATIENT:  Lynn Found  1965       ASSESSMENT:     1  Plantar fasciitis  lidocaine (PF) (XYLOCAINE-MPF) 1 % injection 2 mL    triamcinolone acetonide (KENALOG-40) 40 mg/mL injection 20 mg    Foot injection   2  Calcaneal spur, left     3  Left foot pain             PLAN:  1  Reviewed medical records  Patient was counseled and educated on the condition and the diagnosis  2  X-ray was personally reviewed  The radiological findings were discussed with the patient  3  The exam and symptoms are consistent with plantar fasiitis  The diagnosis, treatment options and prognosis were discussed with the patient  4  Treatment options were discussed and the patient wished to proceed with an injection  See procedure  5  Instructed supportive care, home exercise, icing, and proper footwear/ arch support  6  Patient will return in 6 weeks for re-evaluation  Imaging: I have personally reviewed pertinent films in PACS  Labs, pathology, and Other Studies: I have personally reviewed pertinent reports  Foot injection     Date/Time 9/26/2022 2:41 PM     Performed by  Mikhail Kirby DPM     Authorized by Mikhail Kirby DPM      Universal Protocol   Consent: Verbal consent obtained  Risks and benefits: risks, benefits and alternatives were discussed  Consent given by: patient  Time out: Immediately prior to procedure a "time out" was called to verify the correct patient, procedure, equipment, support staff and site/side marked as required  Timeout called at: 9/26/2022 2:41 PM   Patient understanding: patient states understanding of the procedure being performed  Site marked: the operative site was marked  Patient identity confirmed: verbally with patient       Procedure Details   Procedure Notes:   Treatment options were discussed and the patient wished to proceed with an injection  A trigger point injection was given to left heel using 0 5cc Kenolog 40 and 2cc 1% Lidocaine pl      Instructed supportive care, icing, and resting  Patient tolerance: Patient tolerated the procedure well with no immediate complications                 Subjective:     HPI  The patient presents with chief complaint of left foot pain for about 1 year  It has been getting worse since the onset  The symptoms presents around left plantar heel with sharp and throbbing sensation  Pain level is about 8 out of 10  The patient has more pain in the morning and after sitting for a while  Pain increases with walking and standing  The patient does not recall any injury, but reports some overuse  The patient tried OTC meds, and different shoes without relieving the pain  Denied any swelling  No associated numbness or paresthesia  No significant weakness or dysfunction  The following portions of the patient's history were reviewed and updated as appropriate: allergies, current medications, past family history, past medical history, past social history, past surgical history and problem list   All pertinent labs and images were reviewed  Past Medical History  Past Medical History:   Diagnosis Date    Carcinoma [facility])        Past Surgical History  Past Surgical History:   Procedure Laterality Date     SECTION      MOHS SURGERY      DC HYSTEROSCOPY,W/ENDO BX N/A 2016    Procedure: DILATATION AND CURETTAGE (D&C) WITH HYSTEROSCOPY;  Surgeon: Paula Florian DO;  Location: [facility];  Service: Gynecology        Allergies:  Patient has no known allergies  Medications:  No current outpatient medications on file  No current facility-administered medications for this visit         Social History:  Social History     Socioeconomic History    Marital status: /Civil Union     Spouse name: None    Number of children: None    Years of education: None    Highest education level: None   Occupational History    None   Tobacco Use    Smoking status: Former Smoker     Quit date:      Years since quittin 7    Smokeless tobacco: Never Used   Vaping Use    Vaping Use: Never used   Substance and Sexual Activity    Alcohol use: Yes     Alcohol/week: 8 0 standard drinks     Types: 8 Glasses of wine per week    Drug use: Never    Sexual activity: Yes     Partners: Male     Birth control/protection: Post-menopausal   Other Topics Concern    None   Social History Narrative    None     Social Determinants of Health     Financial Resource Strain: Not on file   Food Insecurity: Not on file   Transportation Needs: Not on file   Physical Activity: Not on file   Stress: Not on file   Social Connections: Not on file   Intimate Partner Violence: Not on file   Housing Stability: Not on file          Review of Systems   Constitutional: Negative for appetite change, chills and fever  Respiratory: Negative for cough and shortness of breath  Cardiovascular: Negative for chest pain and leg swelling  Gastrointestinal: Negative for diarrhea, nausea and vomiting  Musculoskeletal: Negative for gait problem  Skin: Negative for rash and wound  Allergic/Immunologic: Negative for immunocompromised state  Neurological: Negative for weakness and numbness  Hematological: Negative  Psychiatric/Behavioral: Negative for behavioral problems and confusion  Objective:      /79   Pulse 69   Ht 5' 5 75" (1 67 m) Comment: verbal  Wt 67 3 kg (148 lb 6 4 oz)   BMI 24 13 kg/m²          Physical Exam  Vitals reviewed  Constitutional:       General: She is not in acute distress  Appearance: Normal appearance  She is not toxic-appearing  HENT:      Head: Normocephalic and atraumatic  Eyes:      Extraocular Movements: Extraocular movements intact  Cardiovascular:      Rate and Rhythm: Normal rate and regular rhythm  Pulses: Normal pulses  Dorsalis pedis pulses are 2+ on the right side and 2+ on the left side          Posterior tibial pulses are 2+ on the right side and 2+ on the left side  Pulmonary:      Effort: Pulmonary effort is normal  No respiratory distress  Musculoskeletal:         General: Tenderness present  No swelling or signs of injury  Normal range of motion  Cervical back: Normal range of motion and neck supple  Right lower leg: No edema  Left lower leg: No edema  Right foot: No foot drop  Left foot: No foot drop  Comments: Focal pain in left plantar medial and plantar heel  No pain on lateral compression left heel  No acute swelling  No Tinel sign  High arched feet  Skin:     General: Skin is warm  Capillary Refill: Capillary refill takes less than 2 seconds  Coloration: Skin is not cyanotic or mottled  Findings: No abscess, ecchymosis, erythema, rash or wound  Nails: There is no clubbing  Neurological:      General: No focal deficit present  Mental Status: She is alert and oriented to person, place, and time  Cranial Nerves: No cranial nerve deficit  Sensory: No sensory deficit  Motor: No weakness  Coordination: Coordination normal    Psychiatric:         Mood and Affect: Mood normal          Behavior: Behavior normal          Thought Content:  Thought content normal          Judgment: Judgment normal

## 2022-11-21 ENCOUNTER — OFFICE VISIT (OUTPATIENT)
Dept: PODIATRY | Facility: CLINIC | Age: 57
End: 2022-11-21

## 2022-11-21 VITALS
HEART RATE: 59 BPM | HEIGHT: 66 IN | DIASTOLIC BLOOD PRESSURE: 77 MMHG | SYSTOLIC BLOOD PRESSURE: 113 MMHG | BODY MASS INDEX: 24.13 KG/M2

## 2022-11-21 DIAGNOSIS — M72.2 PLANTAR FASCIITIS: Primary | ICD-10-CM

## 2022-11-21 NOTE — PROGRESS NOTES
PATIENT:  Abigail Rivers  1965       ASSESSMENT:     1  Plantar fasciitis                PLAN:  1  Reviewed medical records  Patient was counseled and educated on the condition and the diagnosis  2  She is doing well  Instructed supportive care, home exercise, icing, and proper footwear/ arch support  3  She may return as needed  Imaging: I have personally reviewed pertinent films in PACS  Labs, pathology, and Other Studies: I have personally reviewed pertinent reports  Procedures        Subjective:     HPI  The patient presents for follow-up on left foot pain  Pain resolved after the injection  No swelling  No numbness  The following portions of the patient's history were reviewed and updated as appropriate: allergies, current medications, past family history, past medical history, past social history, past surgical history and problem list   All pertinent labs and images were reviewed  Past Medical History  Past Medical History:   Diagnosis Date   • Carcinoma [facility])        Past Surgical History  Past Surgical History:   Procedure Laterality Date   •  SECTION     • MOHS SURGERY     • [place] HYSTEROSCOPY,W/ENDO BX N/A 2016    Procedure: DILATATION AND CURETTAGE (D&C) WITH HYSTEROSCOPY;  Surgeon: Cristal Watson DO;  [facility];  Service: Gynecology        Allergies:  Patient has no known allergies  Medications:  No current outpatient medications on file  No current facility-administered medications for this visit         Social History:  Social History     Socioeconomic History   • Marital status: /Civil Union     Spouse name: None   • Number of children: None   • Years of education: None   • Highest education level: None   Occupational History   • None   Tobacco Use   • Smoking status: Former     Types: Cigarettes     Quit date:      Years since quittin 9   • Smokeless tobacco: Never   Vaping Use   • Vaping Use: Never used Substance and Sexual Activity   • Alcohol use: Yes     Alcohol/week: 8 0 standard drinks     Types: 8 Glasses of wine per week   • Drug use: Never   • Sexual activity: Yes     Partners: Male     Birth control/protection: Post-menopausal   Other Topics Concern   • None   Social History Narrative   • None     Social Determinants of Health     Financial Resource Strain: Not on file   Food Insecurity: Not on file   Transportation Needs: Not on file   Physical Activity: Not on file   Stress: Not on file   Social Connections: Not on file   Intimate Partner Violence: Not on file   Housing Stability: Not on file          Review of Systems   Constitutional: Negative for appetite change, chills and fever  Respiratory: Negative for cough and shortness of breath  Cardiovascular: Negative for chest pain and leg swelling  Gastrointestinal: Negative for diarrhea, nausea and vomiting  Musculoskeletal: Negative for gait problem  Neurological: Negative for numbness  Objective:      /77   Pulse 59   Ht 5' 5 75" (1 67 m) Comment: verbal  BMI 24 13 kg/m²          Physical Exam  Vitals reviewed  Constitutional:       General: She is not in acute distress  Appearance: Normal appearance  She is not toxic-appearing  Cardiovascular:      Rate and Rhythm: Normal rate and regular rhythm  Pulses: Normal pulses  Dorsalis pedis pulses are 2+ on the right side and 2+ on the left side  Posterior tibial pulses are 2+ on the right side and 2+ on the left side  Pulmonary:      Effort: Pulmonary effort is normal  No respiratory distress  Musculoskeletal:         General: No swelling, tenderness or signs of injury  Normal range of motion  Right lower leg: No edema  Left lower leg: No edema  Right foot: No foot drop  Left foot: No foot drop  Comments: No pain in left plantar medial and plantar heel  No Tinel sign  High arched feet       Skin:     General: Skin is warm       Capillary Refill: Capillary refill takes less than 2 seconds  Coloration: Skin is not cyanotic or mottled  Findings: No abscess, ecchymosis, erythema, rash or wound  Nails: There is no clubbing  Neurological:      General: No focal deficit present  Mental Status: She is alert and oriented to person, place, and time  Cranial Nerves: No cranial nerve deficit  Sensory: No sensory deficit  Motor: No weakness  Coordination: Coordination normal    Psychiatric:         Mood and Affect: Mood normal          Behavior: Behavior normal          Thought Content:  Thought content normal          Judgment: Judgment normal

## 2022-12-27 ENCOUNTER — ANNUAL EXAM (OUTPATIENT)
Dept: OBGYN CLINIC | Facility: CLINIC | Age: 57
End: 2022-12-27

## 2022-12-27 VITALS
WEIGHT: 143 LBS | BODY MASS INDEX: 22.98 KG/M2 | HEIGHT: 66 IN | DIASTOLIC BLOOD PRESSURE: 68 MMHG | SYSTOLIC BLOOD PRESSURE: 110 MMHG

## 2022-12-27 DIAGNOSIS — Z12.31 ENCOUNTER FOR SCREENING MAMMOGRAM FOR MALIGNANT NEOPLASM OF BREAST: ICD-10-CM

## 2022-12-27 DIAGNOSIS — Z01.419 ENCOUNTER FOR ANNUAL ROUTINE GYNECOLOGICAL EXAMINATION: Primary | ICD-10-CM

## 2022-12-27 NOTE — PROGRESS NOTES
Assessment/Plan:  Pap smear deferred due to low risk status  Encourage self breast examination as well as calcium supplementation  Continue annual mammogram   Reviewed colon cancer screening, up-to-date with colonoscopy (2021 revealing polyps with follow-up in 5 years)  She will continue to follow-up with primary care as scheduled  Return to office in 1 year or as needed  No problem-specific Assessment & Plan notes found for this encounter  Diagnoses and all orders for this visit:    Encounter for annual routine gynecological examination    Encounter for screening mammogram for malignant neoplasm of breast  -     Mammo screening bilateral w 3d & cad; Future          Subjective:      Patient ID: Tristan Devi is a 65 y o  female  HPI     This is a pleasant 67-year-old female P3 ( x1,  x2, age 33, 23, 22) presents for her GYN exam   She went through menopause at age 51  She has never been on hormone replacement therapy  She denies any vaginal bleeding or spotting  No changes in bowel or bladder function  She has been in a monogamous relationship for over 31 years  Pap smears have been normal   Last Pap   She underwent colonoscopy 2021 revealing polyps with follow-up in 5 years  She is due for her screening mammogram as well as annual visit with primary care  The following portions of the patient's history were reviewed and updated as appropriate: allergies, current medications, past family history, past medical history, past social history, past surgical history and problem list     Review of Systems   Constitutional: Negative for fatigue, fever and unexpected weight change  Respiratory: Negative for cough, chest tightness, shortness of breath and wheezing  Cardiovascular: Negative  Negative for chest pain and palpitations  Gastrointestinal: Negative  Negative for abdominal distention, abdominal pain, blood in stool, constipation, diarrhea, nausea and vomiting  Genitourinary: Negative  Negative for difficulty urinating, dyspareunia, dysuria, flank pain, frequency, genital sores, hematuria, pelvic pain, urgency, vaginal bleeding, vaginal discharge and vaginal pain  Skin: Negative for rash  Objective:      /68   Ht 5' 5 75" (1 67 m)   Wt 64 9 kg (143 lb)   BMI 23 26 kg/m²          Physical Exam  Constitutional:       Appearance: Normal appearance  She is well-developed  Cardiovascular:      Rate and Rhythm: Normal rate and regular rhythm  Pulmonary:      Effort: Pulmonary effort is normal       Breath sounds: Normal breath sounds  Chest:   Breasts:     Right: No inverted nipple, mass, nipple discharge, skin change or tenderness  Left: No inverted nipple, mass, nipple discharge, skin change or tenderness  Abdominal:      General: Bowel sounds are normal  There is no distension  Palpations: Abdomen is soft  Tenderness: There is no abdominal tenderness  There is no guarding or rebound  Genitourinary:     Labia:         Right: No rash, tenderness or lesion  Left: No rash, tenderness or lesion  Vagina: Normal  No signs of injury  No vaginal discharge or tenderness  Cervix: No cervical motion tenderness, discharge, friability, lesion, erythema or cervical bleeding  Uterus: Not enlarged and not tender  Adnexa:         Right: No mass, tenderness or fullness  Left: No mass, tenderness or fullness  Neurological:      Mental Status: She is alert and oriented to person, place, and time  Psychiatric:         Behavior: Behavior normal        External genitalia is within normal limits  The vagina is evident of slight estrogen deficiency  There is no pelvic floor prolapse

## 2023-03-27 ENCOUNTER — OFFICE VISIT (OUTPATIENT)
Dept: PODIATRY | Facility: CLINIC | Age: 58
End: 2023-03-27

## 2023-03-27 VITALS
SYSTOLIC BLOOD PRESSURE: 113 MMHG | DIASTOLIC BLOOD PRESSURE: 75 MMHG | HEIGHT: 66 IN | WEIGHT: 143 LBS | BODY MASS INDEX: 22.98 KG/M2 | HEART RATE: 59 BPM

## 2023-03-27 DIAGNOSIS — M77.32 CALCANEAL SPUR, LEFT: ICD-10-CM

## 2023-03-27 DIAGNOSIS — M72.2 PLANTAR FASCIITIS: Primary | ICD-10-CM

## 2023-03-27 DIAGNOSIS — M79.672 LEFT FOOT PAIN: ICD-10-CM

## 2023-03-27 RX ORDER — TRIAMCINOLONE ACETONIDE 40 MG/ML
20 INJECTION, SUSPENSION INTRA-ARTICULAR; INTRAMUSCULAR ONCE
Status: COMPLETED | OUTPATIENT
Start: 2023-03-27 | End: 2023-03-27

## 2023-03-27 RX ORDER — ROPIVACAINE HYDROCHLORIDE 5 MG/ML
2 INJECTION, SOLUTION EPIDURAL; INFILTRATION; PERINEURAL ONCE
Status: COMPLETED | OUTPATIENT
Start: 2023-03-27 | End: 2023-03-27

## 2023-03-27 RX ADMIN — TRIAMCINOLONE ACETONIDE 20 MG: 40 INJECTION, SUSPENSION INTRA-ARTICULAR; INTRAMUSCULAR at 15:41

## 2023-03-27 RX ADMIN — ROPIVACAINE HYDROCHLORIDE 2 ML: 5 INJECTION, SOLUTION EPIDURAL; INFILTRATION; PERINEURAL at 15:56

## 2023-03-27 NOTE — PROGRESS NOTES
"               PATIENT:  Micheal Mercado  1965         ASSESSMENT:     1  Plantar fasciitis  triamcinolone acetonide (KENALOG-40) 40 mg/mL injection 20 mg    ropivacaine (NAROPIN) 0 5 % injection 2 mL    Ambulatory referral to Physical Therapy    Foot injection      2  Calcaneal spur, left        3  Left foot pain                PLAN:  1  Reviewed medical records  Patient was counseled and educated on the condition and the diagnosis  2  Previous X-ray was reviewed  The diagnosis, treatment options and prognosis were discussed with the patient  3  Treatment options were discussed and the patient wished to proceed with an injection  See procedure  4  Referred her to PT  Instructed supportive care, home exercise, icing, and proper footwear/ arch support  5  Discussed surgical options as well  6  Patient will return in 6 weeks for re-evaluation  Imaging: I have personally reviewed pertinent films in PACS  Labs, pathology, and Other Studies: I have personally reviewed pertinent reports  Foot injection     Date/Time 3/27/2023 3:57 PM     Performed by  Simona Diaz DPM     Authorized by Simona Diaz DPM      Universal Protocol   Consent: Verbal consent obtained  Risks and benefits: risks, benefits and alternatives were discussed  Consent given by: patient  Time out: Immediately prior to procedure a \"time out\" was called to verify the correct patient, procedure, equipment, support staff and site/side marked as required  Timeout called at: 3/27/2023 3:57 PM   Patient understanding: patient states understanding of the procedure being performed  Site marked: the operative site was marked  Patient identity confirmed: verbally with patient       Procedure Details   Procedure Notes:   Treatment options were discussed and the patient wished to proceed with an injection  A trigger point injection was given to left plantar heel using 0 5cc Kenolog 40 and 2cc 0 5% Ropivacaine pl      Instructed " supportive care, icing, and resting  Patient tolerance: Patient tolerated the procedure well with no immediate complications                 Subjective:     HPI  The patient presents with chief complaint of left foot pain for about 1 year  It has been getting worse since the onset  The symptoms presents around left plantar heel with sharp and throbbing sensation  Pain level is about 8 out of 10  The patient has more pain in the morning and after sitting for a while  Pain increases with walking and standing  The patient does not recall any injury, but reports some overuse  The patient tried OTC meds, and different shoes without relieving the pain  Denied any swelling  No associated numbness or paresthesia  No significant weakness or dysfunction  The following portions of the patient's history were reviewed and updated as appropriate: allergies, current medications, past family history, past medical history, past social history, past surgical history and problem list   All pertinent labs and images were reviewed  Past Medical History  Past Medical History:   Diagnosis Date   • Carcinoma [facility])        Past Surgical History  Past Surgical History:   Procedure Laterality Date   •  SECTION     • MOHS SURGERY     • RI HYSTEROSCOPY BX ENDOMETRIUM&/POLYPC W/WO D&C N/A 2016    Procedure: DILATATION AND CURETTAGE (D&C) WITH HYSTEROSCOPY;  Surgeon: Doroteo Blake DO;  Location: [facility];  Service: Gynecology        Allergies:  Patient has no known allergies  Medications:  No current outpatient medications on file  No current facility-administered medications for this visit         Social History:  Social History     Socioeconomic History   • Marital status: /Civil Union     Spouse name: None   • Number of children: None   • Years of education: None   • Highest education level: None   Occupational History   • None   Tobacco Use   • Smoking status: Former     Types: "Cigarettes     Quit date: 0     Years since quittin 2   • Smokeless tobacco: Never   Vaping Use   • Vaping Use: Never used   Substance and Sexual Activity   • Alcohol use: Yes     Alcohol/week: 8 0 standard drinks     Types: 8 Glasses of wine per week   • Drug use: Never   • Sexual activity: Yes     Partners: Male     Birth control/protection: Post-menopausal   Other Topics Concern   • None   Social History Narrative   • None     Social Determinants of Health     Financial Resource Strain: Not on file   Food Insecurity: Not on file   Transportation Needs: Not on file   Physical Activity: Not on file   Stress: Not on file   Social Connections: Not on file   Intimate Partner Violence: Not on file   Housing Stability: Not on file          Review of Systems   Constitutional: Negative for appetite change, chills and fever  Respiratory: Negative for cough and shortness of breath  Cardiovascular: Negative for chest pain and leg swelling  Gastrointestinal: Negative for diarrhea, nausea and vomiting  Musculoskeletal: Negative for gait problem  Neurological: Negative for numbness  Objective:      /75   Pulse 59   Ht 5' 5 75\" (1 67 m)   Wt 64 9 kg (143 lb)   BMI 23 26 kg/m²          Physical Exam  Vitals reviewed  Constitutional:       General: She is not in acute distress  Appearance: Normal appearance  She is not toxic-appearing  Cardiovascular:      Rate and Rhythm: Normal rate and regular rhythm  Pulses: Normal pulses  Dorsalis pedis pulses are 2+ on the right side and 2+ on the left side  Posterior tibial pulses are 2+ on the right side and 2+ on the left side  Pulmonary:      Effort: Pulmonary effort is normal  No respiratory distress  Musculoskeletal:         General: Tenderness present  No swelling or signs of injury  Normal range of motion  Right lower leg: No edema  Left lower leg: No edema  Right foot: No foot drop        Left foot: " No foot drop  Comments: Focal pain in left plantar heel  No pain on lateral compression left heel  No acute swelling  No Tinel sign  Skin:     General: Skin is warm  Capillary Refill: Capillary refill takes less than 2 seconds  Coloration: Skin is not cyanotic or mottled  Findings: No abscess, ecchymosis, erythema, rash or wound  Nails: There is no clubbing  Neurological:      General: No focal deficit present  Mental Status: She is alert and oriented to person, place, and time  Cranial Nerves: No cranial nerve deficit  Sensory: No sensory deficit  Motor: No weakness  Coordination: Coordination normal    Psychiatric:         Mood and Affect: Mood normal          Behavior: Behavior normal          Thought Content:  Thought content normal          Judgment: Judgment normal

## 2023-04-24 ENCOUNTER — OFFICE VISIT (OUTPATIENT)
Dept: PHYSICAL THERAPY | Facility: CLINIC | Age: 58
End: 2023-04-24

## 2023-04-24 DIAGNOSIS — M72.2 PLANTAR FASCIITIS: Primary | ICD-10-CM

## 2023-04-24 NOTE — PROGRESS NOTES
"Daily Note     Today's date: 2023  Patient name: Jesús Foreman  : 1965  MRN: [id number]  Referring provider: Johnathon Romberg, DPM  Dx:   Encounter Diagnosis     ICD-10-CM    1  Plantar fasciitis  M72 2                      Subjective: Pt reports she has pretty much been pain free since her last cortisone shot  Objective: See treatment diary below      Assessment: Pt given stair stretch for HEP  Reported minimal discomfort with IASTM to heel  Plan: Continue per plan of care        Precautions: see PMH      Manuals 4/10 4/14 4/17 4/21 4/24        IASTM - focus on heel and PF KR  MO  MD Montiel mobs              EPAT  calf  D20-T  2 3 bar  15 Hz  heel  DI15  2 2 bar  15 Hz  PF  DI15  1 8 bar  15 Hz  3000 ea   NV d20 PF 1 5 bar  achilles 2 5 bar 15hz 3000pulses each        NV                     Neuro Re-Ed             Backward walking              clamshells             bridges                                                    Ther Ex             gastroc stretch (HEP) Towel 3x30\" manual manual 3x30\"          Stair stretch   NV  30\"x3 HEP       Pro stretch  30\"x3 30\"x3 30\"x 30\"x3        Hr/tr  20 ea 20x ea Single leg x20 SL eccentric on step x15        Wobble board  20 ea 20x ea x30 x20 ea SL        bike  8' 8' 8' 8'        rebounder    blue foam red ball 15x2                      Ther Activity                                       Gait Training                                       Modalities                                            "

## 2023-04-28 ENCOUNTER — OFFICE VISIT (OUTPATIENT)
Dept: PHYSICAL THERAPY | Facility: CLINIC | Age: 58
End: 2023-04-28

## 2023-04-28 DIAGNOSIS — M72.2 PLANTAR FASCIITIS: Primary | ICD-10-CM

## 2023-04-28 NOTE — PROGRESS NOTES
"Daily Note     Today's date: 2023  Patient name: Rosales Alejandro  : 1965  MRN: [id number]  Referring provider: Carol Jewell DPM  Dx:   Encounter Diagnosis     ICD-10-CM    1  Plantar fasciitis  M72 2           Start Time: 98  Stop Time: 50  Total time in clinic (min): 39 minutes      Subjective: Patient reports her left foot has been feeling better although her left calf is sore at times  Objective: See treatment diary below  Assessment: Treatment is tolerated well  Patient would benefit from continued PT to further decrease symptoms of plantar fascitis  Plan: Continue treatment as per PT plan of care         Precautions: see PMH      Manuals 4/10 4/14 4/17 4/21 4/24 4/28       IASTM - focus on heel and PF KR  MO  MD        Ray mobs              EPAT  calf  D20-T  2 3 bar  15 Hz  heel  DI15  2 2 bar  15 Hz  PF  DI15  1 8 bar  15 Hz  3000 ea   NV d20 PF 1 5 bar  achilles 2 5 bar 15hz 3000pulses each        NV calf  D20-T  2 3 bar  15 Hz  heel  DI15  2 2 bar  15 Hz  PF  DI15  1 6 bar  15 Hz  3000 ea                    Neuro Re-Ed             Backward walking              clamshells             bridges                                                    Ther Ex             gastroc stretch (HEP) Towel 3x30\" manual manual 3x30\"          Stair stretch   NV  30\"x3 HEP       Pro stretch  30\"x3 30\"x3 30\"x 30\"x3 30\"x4       Hr/tr  20 ea 20x ea Single leg x20 SL eccentric on step x15 ecc hr  30       Wobble board  20 ea 20x ea x30 x20 ea SL 30 ea       bike  8' 8' 8' 8' 8'       rebounder    blue foam red ball 15x2                      Ther Activity                                       Gait Training                                       Modalities                                            "

## 2023-05-01 ENCOUNTER — OFFICE VISIT (OUTPATIENT)
Dept: PHYSICAL THERAPY | Facility: CLINIC | Age: 58
End: 2023-05-01

## 2023-05-01 DIAGNOSIS — M72.2 PLANTAR FASCIITIS: Primary | ICD-10-CM

## 2023-05-01 NOTE — PROGRESS NOTES
"Daily Note     Today's date: 2023  Patient name: Jeaneth Blue  : 1965  MRN: [id number]  Referring provider: Mendel Fuse, DPM  Dx:   Encounter Diagnosis     ICD-10-CM    1  Plantar fasciitis  M72 2                      Subjective: Pt states \"it's been okay  \"    Objective: See treatment diary below    Assessment: Performed exercise program w/o discomfort, \"it's not easy  \" IASTM to L PF, focusing on the heel  Responded well to same  Will monitor  Plan: Cont per POC       Precautions: see PMH      Manuals 4/10 4/14 4/17 4/21 4/24 4/28 5/1      IASTM - focus on heel and PF KR  ARI MARI  MO      Ray mobs              EPAT  calf  D20-T  2 3 bar  15 Hz  heel  DI15  2 2 bar  15 Hz  PF  DI15  1 8 bar  15 Hz  3000 ea   NV d20 PF 1 5 bar  achilles 2 5 bar 15hz 3000pulses each        NV calf  D20-T  2 3 bar  15 Hz  heel  DI15  2 2 bar  15 Hz  PF  DI15  1 6 bar  15 Hz  3000 ea NV                   Neuro Re-Ed             Backward walking              clamshells             bridges                                                    Ther Ex             gastroc stretch (HEP) Towel 3x30\" manual manual 3x30\"          Stair stretch   NV  30\"x3 HEP HEP      Pro stretch  30\"x3 30\"x3 30\"x 30\"x3 30\"x4 30\"x4      Hr/tr  20 ea 20x ea Single leg x20 SL eccentric on step x15 ecc hr  30 ecc HR 30      Wobble board  20 ea 20x ea x30 x20 ea SL 30 ea 30 ea      bike  8' 8' 8' 8' 8' 8'      rebounder    blue foam red ball 15x2                      Ther Activity                                       Gait Training                                       Modalities                                            "

## 2023-05-05 ENCOUNTER — OFFICE VISIT (OUTPATIENT)
Dept: PHYSICAL THERAPY | Facility: CLINIC | Age: 58
End: 2023-05-05

## 2023-05-05 DIAGNOSIS — M72.2 PLANTAR FASCIITIS: Primary | ICD-10-CM

## 2023-05-05 NOTE — PROGRESS NOTES
"Daily Note     Today's date: 2023  Patient name: Skylar Diaz  : 1965  MRN: [id number]  Referring provider: Lolis Herring DPM  Dx:   Encounter Diagnosis     ICD-10-CM    1  Plantar fasciitis  M72 2                      Subjective: Pt reports that she is having min pain upon arrival to PT    Objective: See treatment diary below    Assessment: pt is sore t/o EPAT but continues to have good tolerance to manual tx  Discussed have 2 weeks of PT to maximize benefit from manual tx and to progress proprioceptive program, pt agrees with this poc    Plan: Cont per POC       Precautions: see [facility]      Manuals 4/10 4/14 4/17 4/21 4/24 4/28 5/1 5/5     IASTM - focus on heel and PF KR  ARI CHAPMAN      Ray mobs              EPAT  calf  D20-T  2 3 bar  15 Hz  heel  DI15  2 2 bar  15 Hz  PF  DI15  1 8 bar  15 Hz  3000 ea   NV d20 PF 1 5 bar  achilles 2 5 bar 15hz 3000pulses each        NV calf  D20-T  2 3 bar  15 Hz  heel  DI15  2 2 bar  15 Hz  PF  DI15  1 6 bar  15 Hz  3000 ea NV calf  D20-T  2 5 bar  15 Hz  heel  DI15  2 2 bar  15 Hz  PF  DI15  1 5 bar  15 Hz  3000 ea                  Neuro Re-Ed             Backward walking              clamshells             bridges                                                    Ther Ex             gastroc stretch (HEP) Towel 3x30\" manual manual 3x30\"          Stair stretch   NV  30\"x3 HEP HEP      Pro stretch  30\"x3 30\"x3 30\"x 30\"x3 30\"x4 30\"x4 30\"x4     Hr/tr  20 ea 20x ea Single leg x20 SL eccentric on step x15 ecc hr  30 ecc HR 30 Single leg off step x20     Wobble board  20 ea 20x ea x30 x20 ea SL 30 ea 30 ea 30     bike  8' 8' 8' 8' 8' 8' 8'     rebounder    blue foam red ball 15x2         balance beam        4 laps     Ther Activity                                       Gait Training                                       Modalities                                            "

## 2023-05-08 ENCOUNTER — OFFICE VISIT (OUTPATIENT)
Dept: PHYSICAL THERAPY | Facility: CLINIC | Age: 58
End: 2023-05-08

## 2023-05-08 ENCOUNTER — OFFICE VISIT (OUTPATIENT)
Dept: PODIATRY | Facility: CLINIC | Age: 58
End: 2023-05-08

## 2023-05-08 VITALS
HEIGHT: 66 IN | SYSTOLIC BLOOD PRESSURE: 113 MMHG | DIASTOLIC BLOOD PRESSURE: 77 MMHG | WEIGHT: 147 LBS | BODY MASS INDEX: 23.63 KG/M2 | HEART RATE: 50 BPM

## 2023-05-08 DIAGNOSIS — M72.2 PLANTAR FASCIITIS: Primary | ICD-10-CM

## 2023-05-08 DIAGNOSIS — M77.32 CALCANEAL SPUR, LEFT: ICD-10-CM

## 2023-05-08 NOTE — PROGRESS NOTES
"Daily Note     Today's date: 2023  Patient name: Erasmo Galarza  : 1965  MRN: [id number]  Referring provider: Venessa Lee DPM  Dx:   Encounter Diagnosis     ICD-10-CM    1  Plantar fasciitis  M72 2                      Subjective: Pt repots no new changes  Objective: See treatment diary below      Assessment: No discomfort noted t/o session  Pt has good knowledge of program        Plan: Continue per plan of care        Precautions: see PMH      Manuals     IASTM - focus on heel and PF  MO  MD ARI MARI    Ray mobs             EPAT calf  D20-T  2 3 bar  15 Hz  heel  DI15  2 2 bar  15 Hz  PF  DI15  1 8 bar  15 Hz  3000 ea   NV d20 PF 1 5 bar  achilles 2 5 bar 15hz 3000pulses each        NV calf  D20-T  2 3 bar  15 Hz  heel  DI15  2 2 bar  15 Hz  PF  DI15  1 6 bar  15 Hz  3000 ea NV calf  D20-T  2 5 bar  15 Hz  heel  DI15  2 2 bar  15 Hz  PF  DI15  1 5 bar  15 Hz  3000 ea                 Neuro Re-Ed            Backward walking             clamshells            bridges                                                Ther Ex            gastroc stretch (HEP) manual manual 3x30\"          Stair stretch  NV  30\"x3 HEP HEP      Pro stretch 30\"x3 30\"x3 30\"x 30\"x3 30\"x4 30\"x4 30\"x4 30\"x4    Hr/tr 20 ea 20x ea Single leg x20 SL eccentric on step x15 ecc hr  30 ecc HR 30 Single leg off step x20 Single leg off step x20    Wobble board 20 ea 20x ea x30 x20 ea SL 30 ea 30 ea 30 30    bike 8' 8' 8' 8' 8' 8' 8' 8'    rebounder   blue foam red ball 15x2         balance beam       4 laps 4 laps    Ther Activity                                    Gait Training                                    Modalities                                         "

## 2023-05-08 NOTE — PROGRESS NOTES
PATIENT:  Christ Schilder  1965         ASSESSMENT:     1  Plantar fasciitis  Device prior authorization      2  Calcaneal spur, left  Device prior authorization              PLAN:  1  Reviewed medical records  Reviewed the notes from PT  Patient was counseled and educated on the condition and the diagnosis  2  The diagnosis, treatment options and prognosis were discussed with the patient  3  She is doing well  Instructed her to continue PT as scheduled  Instructed supportive care, home exercise, icing, and proper footwear/ arch support  Sent her for OTC orthotics  4  Recommended custom orthotics  Will call her insurance for possible coverage  5  Pt will return for possible casting for orthotics  Imaging: I have personally reviewed pertinent films in PACS  Labs, pathology, and Other Studies: I have personally reviewed pertinent reports  Subjective:     HPI  The patient presents for follow-up on left heel pain  She is doing well with minimal pain  She has been going to PT as well  No new complaint  Denied any swelling  No associated numbness or paresthesia  No significant weakness or dysfunction  The following portions of the patient's history were reviewed and updated as appropriate: allergies, current medications, past family history, past medical history, past social history, past surgical history and problem list   All pertinent labs and images were reviewed  Past Medical History  Past Medical History:   Diagnosis Date   • Carcinoma [facility])        Past Surgical History  Past Surgical History:   Procedure Laterality Date   •  SECTION     • MOHS SURGERY     • [place] HYSTEROSCOPY BX ENDOMETRIUM&/POLYPC W/WO D&C N/A 2016    Procedure: DILATATION AND CURETTAGE (D&C) WITH HYSTEROSCOPY;  Surgeon: Isidro Izquierdo DO;  Location[facility];  Service: Gynecology        Allergies:  Patient has no known allergies      Medications:  No current "outpatient medications on file  No current facility-administered medications for this visit  Social History:  Social History     Socioeconomic History   • Marital status: /Civil Union     Spouse name: None   • Number of children: None   • Years of education: None   • Highest education level: None   Occupational History   • None   Tobacco Use   • Smoking status: Former     Types: Cigarettes     Quit date:      Years since quittin 3   • Smokeless tobacco: Never   Vaping Use   • Vaping Use: Never used   Substance and Sexual Activity   • Alcohol use: Yes     Alcohol/week: 8 0 standard drinks     Types: 8 Glasses of wine per week   • Drug use: Never   • Sexual activity: Yes     Partners: Male     Birth control/protection: Post-menopausal   Other Topics Concern   • None   Social History Narrative   • None     Social Determinants of Health     Financial Resource Strain: Not on file   Food Insecurity: Not on file   Transportation Needs: Not on file   Physical Activity: Not on file   Stress: Not on file   Social Connections: Not on file   Intimate Partner Violence: Not on file   Housing Stability: Not on file          Review of Systems   Constitutional: Negative for appetite change, chills and fever  Respiratory: Negative for cough and shortness of breath  Cardiovascular: Negative for chest pain and leg swelling  Gastrointestinal: Negative for diarrhea, nausea and vomiting  Musculoskeletal: Negative for gait problem  Neurological: Negative for numbness  Objective:      /77   Pulse (!) 50   Ht 5' 5 75\" (1 67 m)   Wt 66 7 kg (147 lb)   BMI 23 91 kg/m²          Physical Exam  Vitals reviewed  Constitutional:       General: She is not in acute distress  Appearance: Normal appearance  She is not toxic-appearing  Cardiovascular:      Rate and Rhythm: Normal rate and regular rhythm  Pulses: Normal pulses             Dorsalis pedis pulses are 2+ on the right side and " 2+ on the left side  Posterior tibial pulses are 2+ on the right side and 2+ on the left side  Pulmonary:      Effort: Pulmonary effort is normal  No respiratory distress  Musculoskeletal:         General: No swelling or signs of injury  Normal range of motion  Right lower leg: No edema  Left lower leg: No edema  Right foot: No foot drop  Left foot: No foot drop  Comments: Minimal pain in left plantar heel  No acute swelling  No Tinel sign  Mild left ankle equinus  Skin:     General: Skin is warm  Capillary Refill: Capillary refill takes less than 2 seconds  Coloration: Skin is not cyanotic or mottled  Findings: No abscess, ecchymosis, erythema, rash or wound  Nails: There is no clubbing  Neurological:      General: No focal deficit present  Mental Status: She is alert and oriented to person, place, and time  Cranial Nerves: No cranial nerve deficit  Sensory: No sensory deficit  Motor: No weakness  Coordination: Coordination normal    Psychiatric:         Mood and Affect: Mood normal          Behavior: Behavior normal          Thought Content:  Thought content normal          Judgment: Judgment normal

## 2023-05-12 ENCOUNTER — APPOINTMENT (OUTPATIENT)
Dept: PHYSICAL THERAPY | Facility: CLINIC | Age: 58
End: 2023-05-12
Payer: COMMERCIAL

## 2023-05-15 ENCOUNTER — OFFICE VISIT (OUTPATIENT)
Dept: PHYSICAL THERAPY | Facility: CLINIC | Age: 58
End: 2023-05-15

## 2023-05-15 DIAGNOSIS — M72.2 PLANTAR FASCIITIS: Primary | ICD-10-CM

## 2023-05-15 NOTE — PROGRESS NOTES
"Daily Note     Today's date: 5/15/2023  Patient name: Kwame Cervantes  : 1965  MRN: [id number]  Referring provider: Karlie Pappas DPM  Dx:   Encounter Diagnosis     ICD-10-CM    1  Plantar fasciitis  M72 2                      Subjective: Pt returns from  , pleased w/progress, to cont as scheduled  Refer to note in Epic  Pt reports her L PF, heel and calf are feeling better  Objective: See treatment diary below    Assessment: Performed exercise program w/o complaint  Rec EPAT, as below  Will monitor  Plan: Cont per POC       Precautions: see PMH      Manuals 4/14 4/17 4/21 4/24 4/28 5/1 5/5 5/8 5/15   IASTM - focus on heel and PF  MO  MD ARI Montiel mobs             EPAT calf  D20-T  2 3 bar  15 Hz  heel  DI15  2 2 bar  15 Hz  PF  DI15  1 8 bar  15 Hz  3000 ea   NV d20 PF 1 5 bar  achilles 2 5 bar 15hz 3000pulses each        NV calf  D20-T  2 3 bar  15 Hz  heel  DI15  2 2 bar  15 Hz  PF  DI15  1 6 bar  15 Hz  3000 ea NV calf  D20-T  2 5 bar  15 Hz  heel  DI15  2 2 bar  15 Hz  PF  DI15  1 5 bar  15 Hz  3000 ea  Calf   D20-T  2 5 barr 15Hz; heel  DI 15  2 2 bar  15 HZ  2 2 bar 15 Hz; PF DI15  1 5 bar  15Hz  3000 ea               Neuro Re-Ed            Backward walking             clamshells            bridges                                                Ther Ex            gastroc stretch (HEP) manual manual 3x30\"          Stair stretch  NV  30\"x3 HEP HEP      Pro stretch 30\"x3 30\"x3 30\"x 30\"x3 30\"x4 30\"x4 30\"x4 30\"x4 30\"x4   Hr/tr 20 ea 20x ea Single leg x20 SL eccentric on step x15 ecc hr  30 ecc HR 30 Single leg off step x20 Single leg off step x20 SL off of step  x20   Wobble board 20 ea 20x ea x30 x20 ea SL 30 ea 30 ea 30 30 30 ea   bike 8' 8' 8' 8' 8' 8' 8' 8' 8'   rebounder   blue foam red ball 15x2         balance beam       4 laps 4 laps 4 laps   Ther Activity                                    Gait Training                                    Modalities                                       "

## 2023-05-19 ENCOUNTER — OFFICE VISIT (OUTPATIENT)
Dept: PHYSICAL THERAPY | Facility: CLINIC | Age: 58
End: 2023-05-19

## 2023-05-19 DIAGNOSIS — M72.2 PLANTAR FASCIITIS: Primary | ICD-10-CM

## 2023-05-19 NOTE — PROGRESS NOTES
"Daily Note     Today's date: 2023  Patient name: Oksana Villegas  : 1965  MRN: [id number]  Referring provider: Angus Rosales DPM  Dx:   Encounter Diagnosis     ICD-10-CM    1  Plantar fasciitis  M72 2           Start Time: 999  Stop Time:   Total time in clinic (min): 38 minutes      Subjective: Patient reports left foot pain is better overall however she feels tightness in her left leg - primarily in the hamstring and glute  Objective: See treatment diary below  Assessment: Mild to moderate tenderness noted in plantar fascia during IASTM  B/L hamstring and glute flexibility is normal on B/L sides  Plan: Primary PT will re-evaluate patient next session         Precautions: see PMH      Manuals 5/19  4/21 4/24 4/28 5/1 5/5 5/8 5/15   IASTM - focus on heel and PF calf, heel, PF JLW   MD ARI Montiel mobs             EPAT   d20 PF 1 5 bar  achilles 2 5 bar 15hz 3000pulses each        NV calf  D20-T  2 3 bar  15 Hz  heel  DI15  2 2 bar  15 Hz  PF  DI15  1 6 bar  15 Hz  3000 ea NV calf  D20-T  2 5 bar  15 Hz  heel  DI15  2 2 bar  15 Hz  PF  DI15  1 5 bar  15 Hz  3000 ea  Calf   D20-T  2 5 barr 15Hz; heel  DI 15  2 2 bar  15 HZ  2 2 bar 15 Hz; PF DI15  1 5 bar  15Hz  3000 ea               Neuro Re-Ed            Backward walking             clamshells            bridges                                                Ther Ex            gastroc stretch manual           Stair stretch    30\"x3 HEP HEP      Pro stretch 30\"x4  30\"x 30\"x3 30\"x4 30\"x4 30\"x4 30\"x4 30\"x4   Hr/tr SL off step  30  Single leg x20 SL eccentric on step x15 ecc hr  30 ecc HR 30 Single leg off step x20 Single leg off step x20 SL off of step  x20   Wobble board 30 ea  x30 x20 ea SL 30 ea 30 ea 30 30 30 ea   bike 8'  8' 8' 8' 8' 8' 8' 8'   rebounder   blue foam red ball 15x2         balance beam       4 laps 4 laps 4 laps               Ther Activity                                    Gait Training                               " Modalities

## 2023-05-22 ENCOUNTER — EVALUATION (OUTPATIENT)
Dept: PHYSICAL THERAPY | Facility: CLINIC | Age: 58
End: 2023-05-22

## 2023-05-22 DIAGNOSIS — M72.2 PLANTAR FASCIITIS: Primary | ICD-10-CM

## 2023-05-22 NOTE — PROGRESS NOTES
PT Re-Evaluation     Today's date: 2023  Patient name: Patti Ding  : 1965  MRN: [id number]  Referring provider: Tessa Wood DPM  Dx:   Encounter Diagnosis     ICD-10-CM    1  Plantar fasciitis  M72 2                      Assessment  Assessment details: Pt is being treated with outpatient PT  She has  made good gains in rom, strength, pain reduction and functional mobility but continues to have deficits  She will benefit from continued skilled PT to address these deficits and to progress to an independent hep  Thank you for this referral         Goals  Short Term Goals: met  Independent performance of initial hep  Decrease pain 2 points on VAS      Long Term Goals: Independent performance of comprehensive hep-partially met  Work performance is returned to max level of function-partially met  Performance of IADL's is returned to max level of function-amie  Performance in recreational activities is improved to max level of function-patially met      Plan  Planned therapy interventions: IADL retraining, manual therapy, massage, joint mobilization, strengthening, stretching, therapeutic activities, therapeutic exercise, therapeutic training and transfer training  Frequency: 1x week  Duration in weeks: 4        Subjective Evaluation    History of Present Illness  Mechanism of injury: Pt reports overall reduction in her pain and improved mobility since starting PT but that she continues to have deficits  Reports that she is an avid hiker and and  has concerns about symptoms returning if she attempts this  Reports compliance with her current hep     Pain  Current pain ratin  At worst pain rating: 3    Patient Goals  Patient goals for therapy: decreased pain, increased motion, increased strength, independence with ADLs/IADLs, return to sport/leisure activities and return to work          Objective       Dorsiflexion (ke): 19 degrees   Plantar flexion: 40 degrees   Inversion: 35 degrees   Eversion: 20 "degrees       Slightly TTP over medial calc tub    Reactive trigger points noted mid-substance of gastroc               Precautions: see [facility]      Manuals 5/19 5/22 4/21 4/24 4/28 5/1 5/5 5/8 5/15   IASTM - focus on heel and PF calf, heel, PF JLW kl  MD ARI Montiel mobs             EPAT   d20 PF 1 5 bar  achilles 2 5 bar 15hz 3000pulses each        NV calf  D20-T  2 3 bar  15 Hz  heel  DI15  2 2 bar  15 Hz  PF  DI15  1 6 bar  15 Hz  3000 ea NV calf  D20-T  2 5 bar  15 Hz  heel  DI15  2 2 bar  15 Hz  PF  DI15  1 5 bar  15 Hz  3000 ea  Calf   D20-T  2 5 barr 15Hz; heel  DI 15  2 2 bar  15 HZ  2 2 bar 15 Hz; PF DI15  1 5 bar  15Hz  3000 ea               Neuro Re-Ed            Backward walking             clamshells            bridges                                                Ther Ex            gastroc stretch manual manual          Stair stretch    30\"x3 HEP HEP      Pro stretch 30\"x4 30\"x4 30\"x 30\"x3 30\"x4 30\"x4 30\"x4 30\"x4 30\"x4   Hr/tr SL off step  30 x30 Single leg x20 SL eccentric on step x15 ecc hr  30 ecc HR 30 Single leg off step x20 Single leg off step x20 SL off of step  x20   Wobble board 30 ea x30 x30 x20 ea SL 30 ea 30 ea 30 30 30 ea   bike 8' 8' 8' 8' 8' 8' 8' 8' 8'   rebounder   blue foam red ball 15x2         balance beam       4 laps 4 laps 4 laps               Ther Activity                                    Gait Training                                    Modalities                                         "

## 2023-05-26 ENCOUNTER — APPOINTMENT (OUTPATIENT)
Dept: PHYSICAL THERAPY | Facility: CLINIC | Age: 58
End: 2023-05-26
Payer: COMMERCIAL

## 2023-06-27 ENCOUNTER — OFFICE VISIT (OUTPATIENT)
Dept: OBGYN CLINIC | Facility: CLINIC | Age: 58
End: 2023-06-27
Payer: COMMERCIAL

## 2023-06-27 VITALS
DIASTOLIC BLOOD PRESSURE: 78 MMHG | WEIGHT: 147.4 LBS | BODY MASS INDEX: 23.69 KG/M2 | SYSTOLIC BLOOD PRESSURE: 126 MMHG | HEIGHT: 66 IN

## 2023-06-27 DIAGNOSIS — N81.6 PELVIC RELAXATION DUE TO RECTOCELE: Primary | ICD-10-CM

## 2023-06-27 PROCEDURE — 99213 OFFICE O/P EST LOW 20 MIN: CPT | Performed by: OBSTETRICS & GYNECOLOGY

## 2023-06-27 NOTE — PROGRESS NOTES
"Assessment/Plan:  Implications of mild pelvic floor prolapse reviewed  Discussed conservative pelvic floor exercises  Discussed other options including consultation with pelvic floor physical therapy, pessary, surgical intervention if symptoms progress  All questions answered  Return to office 2023 for annual visit or as needed  No problem-specific Assessment & Plan notes found for this encounter  Diagnoses and all orders for this visit:    Pelvic relaxation due to rectocele          Subjective:      Patient ID: Bernadette Martin is a 65 y o  female  HPI     This is a pleasant 52-year-old female P3 ( x1,  x2, age 33, 33, 26) presents with concerns of possible uterine prolapse  She noticed approximately 1 week ago upon showering where she felt a vaginal protrusion  She has not felt it since  She denies any vaginal bleeding or spotting  No pelvic floor discomfort  She denies urine/fecal incontinence  She denies any excessive thick strenuous activities  The following portions of the patient's history were reviewed and updated as appropriate: allergies, current medications, past family history, past medical history, past social history, past surgical history and problem list     Review of Systems   Constitutional: Negative for fatigue, fever and unexpected weight change  Respiratory: Negative for cough, chest tightness, shortness of breath and wheezing  Cardiovascular: Negative  Negative for chest pain and palpitations  Gastrointestinal: Negative  Negative for abdominal distention, abdominal pain, blood in stool, constipation, diarrhea, nausea and vomiting  Genitourinary: Negative  Negative for difficulty urinating, dyspareunia, dysuria, flank pain, frequency, genital sores, hematuria, pelvic pain, urgency, vaginal bleeding, vaginal discharge and vaginal pain  Skin: Negative for rash           Objective:      /78   Ht 5' 5 75\" (1 67 m)   Wt 66 9 kg (147 lb 6 4 oz)  " BMI 23 97 kg/m²          Physical Exam  Constitutional:       Appearance: Normal appearance  Pulmonary:      Effort: Pulmonary effort is normal    Abdominal:      General: Bowel sounds are normal  There is no distension  Palpations: Abdomen is soft  Tenderness: There is no abdominal tenderness  Genitourinary:     Labia:         Right: No rash, tenderness or lesion  Left: No rash, tenderness or lesion  Vagina: No signs of injury  No vaginal discharge, tenderness or lesions  Cervix: No cervical motion tenderness, discharge, friability, lesion, erythema or cervical bleeding  Uterus: Not enlarged and not tender  Neurological:      Mental Status: She is alert and oriented to person, place, and time  External genitalia is within normal limits  The vagina is evident of slight estrogen deficiency  Cervix is parous  Upon Valsalva she has a mild cystocele  Mild rectocele noted  No evidence of incontinence

## 2023-07-20 ENCOUNTER — TELEPHONE (OUTPATIENT)
Dept: PODIATRY | Facility: CLINIC | Age: 58
End: 2023-07-20

## 2023-07-24 ENCOUNTER — EVALUATION (OUTPATIENT)
Dept: PHYSICAL THERAPY | Facility: CLINIC | Age: 58
End: 2023-07-24
Payer: COMMERCIAL

## 2023-07-24 DIAGNOSIS — M26.629 TMJPDS (TEMPOROMANDIBULAR JOINT PAIN DYSFUNCTION SYNDROME): Primary | ICD-10-CM

## 2023-07-24 PROCEDURE — 97161 PT EVAL LOW COMPLEX 20 MIN: CPT | Performed by: PHYSICAL THERAPIST

## 2023-07-24 NOTE — LETTER
2023    Cameron Delgado, [address] [place] [id number]    Patient: Orville Ashley   YOB: 1965   Date of Visit: 2023     Encounter Diagnosis     ICD-10-CM    1. TMJPDS (temporomandibular joint pain dysfunction syndrome)  M26.629           Dear Dr. Maureen Barr: Thank you for your recent referral of Orville Ashley. Please review the attached evaluation summary from Char's recent visit. Please verify that you agree with the plan of care by signing the attached order. If you have any questions or concerns, please do not hesitate to call. I sincerely appreciate the opportunity to share in the care of one of your patients and hope to have another opportunity to work with you in the near future. Sincerely,    Hiwot Velásquez, PT      Referring Provider:      I certify that I have read the below Plan of Care and certify the need for these services furnished under this plan of treatment while under my care. Cameron Delgado DDS  [address] [facility] [phone]  Via Fax: [phone]          PT Evaluation     Today's date: 2023  Patient name: Orville Ashley  : 1965  MRN: [id number]  Referring provider: Cameron Delgado DDS  Dx:   Encounter Diagnosis     ICD-10-CM    1. TMJPDS (temporomandibular joint pain dysfunction syndrome)  M26.629                      Assessment  Assessment details: Pt is a 65 y.o. female who presents to outpatient PT with pain, decreased rom, decreased strength and decreased functional mobility. She has been provided with an hep. She will f/u in 3-4 weeks to determine effectiveness of hep. She is to call the clinic earlier if she has difficulty with this program. Thank you for this referral.          Goals  Short Term Goals: Independent performance of initial hep  Decrease pain 2 points on VAS      Long Term Goals:   Independent performance of comprehensive hep  Performance of IADL's is returned to max level of function  Opening rom [address] [facility] details: F/u prn  Planned therapy interventions: home exercise program, postural training, strengthening, stretching, therapeutic activities and manual therapy        Subjective Evaluation    History of Present Illness  Mechanism of injury: Pt reports "i've had TMJ as long as I can remember". Reports that she has worn night guards but she will regularly bite through them. Reports that she recently changes night guards and now she feels her bite is "off". Reports that she can correct this if she thinks about it. Reports that she has stopped wearing the . Notice this sensation the most when she fully closes. Denies regular headaches. Reports slight B/L TMJ pain. Denies any new or recurring joint sounds. Pt reports that she has been compliant with her hep for foot pain and feels this helps manager her pain but that it is still present. She has an apt with podiatry for orthotics and would like to re-assess this area at her next visit. Patient Goals  Patient goals for therapy: decreased pain, increased motion, increased strength and independence with ADLs/IADLs    Pain  Current pain ratin  At worst pain ratin          Objective       TMJ:    ROM:    Openin mm  L lateral glide:  6mm  R lateral glide:  4mm  Protrusion: 7mm      Deflection present: slight to the right with end range opening    Joint sound: slight crepitus but no cavitation.      Rounded shoulder and foreward head posture  Poor control of deep cervical flexors noted with chin tuck  Reactive trigger points noted in masseter R>L      Flowsheet Rows    Flowsheet Row Most Recent Value   PT/OT G-Codes    Current Score 64   Projected Score 71            Precautions: chronic TMJD        Manuals                                                                 Neuro Re-Ed                                                                                                        Ther Ex                                                                                                                     Ther Activity                                       Gait Training                                       Modalities

## 2023-07-24 NOTE — PROGRESS NOTES
PT Evaluation     Today's date: 2023  Patient name: Lina Tariq  : 1965  MRN: [id number]  Referring provider: Harmony Cobb DDS  Dx:   Encounter Diagnosis     ICD-10-CM    1. TMJPDS (temporomandibular joint pain dysfunction syndrome)  M26.629                      Assessment  Assessment details: Pt is a 65 y.o. female who presents to outpatient PT with pain, decreased rom, decreased strength and decreased functional mobility. She has been provided with an hep. She will f/u in 3-4 weeks to determine effectiveness of hep. She is to call the clinic earlier if she has difficulty with this program. Thank you for this referral.          Goals  Short Term Goals: Independent performance of initial hep  Decrease pain 2 points on VAS      Long Term Goals: Independent performance of comprehensive hep  Performance of IADL's is returned to max level of function  Opening rom [address] details: F/u prn  Planned therapy interventions: home exercise program, postural training, strengthening, stretching, therapeutic activities and manual therapy        Subjective Evaluation    History of Present Illness  Mechanism of injury: Pt reports "i've had TMJ as long as I can remember". Reports that she has worn night guards but she will regularly bite through them. Reports that she recently changes night guards and now she feels her bite is "off". Reports that she can correct this if she thinks about it. Reports that she has stopped wearing the . Notice this sensation the most when she fully closes. Denies regular headaches. Reports slight B/L TMJ pain. Denies any new or recurring joint sounds. Pt reports that she has been compliant with her hep for foot pain and feels this helps manager her pain but that it is still present. She has an apt with podiatry for orthotics and would like to re-assess this area at her next visit.     Patient Goals  Patient goals for therapy: decreased pain, increased motion, increased strength and independence with ADLs/IADLs    Pain  Current pain ratin  At worst pain ratin          Objective       TMJ:    ROM:    Openin mm  L lateral glide:  6mm  R lateral glide:  4mm  Protrusion: 7mm      Deflection present: slight to the right with end range opening    Joint sound: slight crepitus but no cavitation.      Rounded shoulder and foreward head posture  Poor control of deep cervical flexors noted with chin tuck  Reactive trigger points noted in masseter R>L      Flowsheet Rows    Flowsheet Row Most Recent Value   PT/OT G-Codes    Current Score 64   Projected Score 71             Precautions: chronic TMJD        Manuals                                                                 Neuro Re-Ed                                                                                                        Ther Ex                                                                                                                     Ther Activity                                       Gait Training                                       Modalities

## 2023-08-02 ENCOUNTER — TELEPHONE (OUTPATIENT)
Dept: PODIATRY | Facility: CLINIC | Age: 58
End: 2023-08-02

## 2023-08-02 ENCOUNTER — OFFICE VISIT (OUTPATIENT)
Dept: PODIATRY | Facility: CLINIC | Age: 58
End: 2023-08-02
Payer: COMMERCIAL

## 2023-08-02 VITALS — HEIGHT: 66 IN | BODY MASS INDEX: 23.63 KG/M2 | WEIGHT: 147 LBS

## 2023-08-02 DIAGNOSIS — M72.2 PLANTAR FASCIITIS: Primary | ICD-10-CM

## 2023-08-02 PROCEDURE — S0395 IMPRESSION CASTING FT: HCPCS | Performed by: PODIATRIST

## 2023-08-02 NOTE — PROGRESS NOTES
Biomechanical exam was performed and she was casted for orthotics. Will call her when it is ready to be picked.

## 2023-08-17 ENCOUNTER — OFFICE VISIT (OUTPATIENT)
Dept: FAMILY MEDICINE CLINIC | Facility: CLINIC | Age: 58
End: 2023-08-17
Payer: COMMERCIAL

## 2023-08-17 ENCOUNTER — HOSPITAL ENCOUNTER (OUTPATIENT)
Dept: MAMMOGRAPHY | Facility: IMAGING CENTER | Age: 58
Discharge: HOME | End: 2023-08-17
Payer: COMMERCIAL

## 2023-08-17 VITALS
HEART RATE: 58 BPM | OXYGEN SATURATION: 98 % | DIASTOLIC BLOOD PRESSURE: 62 MMHG | HEIGHT: 65 IN | SYSTOLIC BLOOD PRESSURE: 100 MMHG | TEMPERATURE: 98.4 F | WEIGHT: 146 LBS | BODY MASS INDEX: 24.32 KG/M2

## 2023-08-17 DIAGNOSIS — Z85.828 HISTORY OF SQUAMOUS CELL CARCINOMA OF SKIN: ICD-10-CM

## 2023-08-17 DIAGNOSIS — Z12.31 ENCOUNTER FOR SCREENING MAMMOGRAM FOR MALIGNANT NEOPLASM OF BREAST: ICD-10-CM

## 2023-08-17 DIAGNOSIS — Z13.1 SCREENING FOR DIABETES MELLITUS: ICD-10-CM

## 2023-08-17 DIAGNOSIS — G89.29 HEEL PAIN, CHRONIC, LEFT: ICD-10-CM

## 2023-08-17 DIAGNOSIS — Z13.220 SCREENING CHOLESTEROL LEVEL: ICD-10-CM

## 2023-08-17 DIAGNOSIS — Z11.59 ENCOUNTER FOR HEPATITIS C SCREENING TEST FOR LOW RISK PATIENT: ICD-10-CM

## 2023-08-17 DIAGNOSIS — Z13.0 SCREENING FOR DEFICIENCY ANEMIA: ICD-10-CM

## 2023-08-17 DIAGNOSIS — Z00.00 ANNUAL PHYSICAL EXAM: Primary | ICD-10-CM

## 2023-08-17 DIAGNOSIS — Z13.29 SCREENING FOR THYROID DISORDER: ICD-10-CM

## 2023-08-17 DIAGNOSIS — M79.672 HEEL PAIN, CHRONIC, LEFT: ICD-10-CM

## 2023-08-17 PROCEDURE — 77063 BREAST TOMOSYNTHESIS BI: CPT

## 2023-08-17 PROCEDURE — 99396 PREV VISIT EST AGE 40-64: CPT | Performed by: NURSE PRACTITIONER

## 2023-08-17 PROCEDURE — 77067 SCR MAMMO BI INCL CAD: CPT

## 2023-08-17 NOTE — PROGRESS NOTES
[facility]    NAME: Matteo Goddard  AGE: 65 y.o. SEX: female  : 1965     DATE: 2023     Assessment and Plan:     Problem List Items Addressed This Visit        Other    History of squamous cell carcinoma of skin     Had excised  Follows with dermatology yearly         Heel pain, chronic, left     Following with podiatry        Other Visit Diagnoses     Annual physical exam    -  Primary    Screening for thyroid disorder        Relevant Orders    TSH, 3rd generation with Free T4 reflex    Screening cholesterol level        Relevant Orders    Lipid panel    Screening for deficiency anemia        Relevant Orders    CBC and differential    Screening for diabetes mellitus        Relevant Orders    Comprehensive metabolic panel    Encounter for hepatitis C screening test for low risk patient        Relevant Orders    Hepatitis C antibody          Immunizations and preventive care screenings were discussed with patient today. Appropriate education was printed on patient's after visit summary. Counseling:  · Injury prevention: discussed safety/seat belts, safety helmets, smoke detectors, carbon dioxide detectors, and smoking near bedding or upholstery. Depression Screening and Follow-up Plan: Patient was screened for depression during today's encounter. They screened negative with a PHQ-2 score of 0. No follow-ups on file. Chief Complaint:     Chief Complaint   Patient presents with   • Physical Exam     Patient has a appointment for her mammogram today  23        History of Present Illness:     Adult Annual Physical   Patient here for a comprehensive physical exam. The patient reports no problems.     Mammogram scheduled for today  Colonoscopy - repeat 5 years  Hx of squamous cell- follows yearly with dermatology- appt today    Saw podiatry, PT for plantar fasciitis got fit for orthodics will get them next month. Has high arches, feels had improvements with injections, stretches and slight coming back. Sometimes has a hard time falling and or staying asleep. Under a lot of stress. Has tried supplements magnesium, 5HTP, valerian root. Without much improvement in sleep. Diet and Physical Activity  · Diet/Nutrition: well balanced diet and consuming 3-5 servings of fruits/vegetables daily. 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      Depression Screening  PHQ-2/9 Depression Screening    Little interest or pleasure in doing things: 0 - not at all  Feeling down, depressed, or hopeless: 0 - not at all  PHQ-2 Score: 0  PHQ-2 Interpretation: Negative depression screen       General Health  · Sleep: sometimes has hard time falling asleep or staying asleep. · Hearing: normal - bilateral.  · Vision: most recent eye exam <1 year ago and reading glasses. · Dental: regular dental visits and brushes teeth twice daily. /GYN Health  · Patient is: postmenopausal     Review of Systems:     Review of Systems   Constitutional: Negative for chills and fever. HENT: Negative for ear pain and sore throat. Eyes: Negative for pain and visual disturbance. Respiratory: Negative for cough and shortness of breath. Cardiovascular: Negative for chest pain, palpitations and leg swelling. Gastrointestinal: Negative for abdominal pain, blood in stool, constipation, diarrhea, nausea and vomiting. Genitourinary: Negative for dysuria and hematuria. Musculoskeletal: Positive for arthralgias. Negative for back pain. Heel pain left     Skin: Negative for color change and rash. Neurological: Negative for seizures and syncope. Hematological: Negative. Psychiatric/Behavioral: Positive for sleep disturbance. All other systems reviewed and are negative.      Past Medical History:     Past Medical History:   Diagnosis Date   • Carcinoma [facility])       Past Surgical History:     Past Surgical History:   Procedure Laterality Date   •  SECTION     • MOHS SURGERY     • [place] HYSTEROSCOPY BX ENDOMETRIUM&/POLYPC W/WO D&C N/A 2016    Procedure: DILATATION AND CURETTAGE (D&C) WITH HYSTEROSCOPY;  Surgeon: Amara Herrera DO;  Location: [facility];  Service: Gynecology      Social History:     Social History     Socioeconomic History   • Marital status: /Civil Union     Spouse name: None   • Number of children: None   • Years of education: None   • Highest education level: None   Occupational History   • None   Tobacco Use   • Smoking status: Former     Types: Cigarettes     Quit date:      Years since quittin.6   • Smokeless tobacco: Never   Vaping Use   • Vaping Use: Never used   Substance and Sexual Activity   • Alcohol use: Yes     Alcohol/week: 8.0 standard drinks of alcohol     Types: 8 Glasses of wine per week     Comment: soc   • Drug use: Never   • Sexual activity: Yes     Partners: Male     Birth control/protection: Post-menopausal   Other Topics Concern   • None   Social History Narrative   • None     Social Determinants of Health     Financial Resource Strain: Not on file   Food Insecurity: Not on file   Transportation Needs: Not on file   Physical Activity: Not on file   Stress: Not on file   Social Connections: Not on file   Intimate Partner Violence: Not on file   Housing Stability: Not on file      Family History:     Family History   Problem Relation Age of Onset   • No Known Problems Mother    • No Known Problems Father    • No Known Problems Sister    • No Known Problems Maternal Grandmother    • No Known Problems Sister    • Melanoma Sister    • No Known Problems Sister    • No Known Problems Paternal Aunt    • Melanoma Paternal Aunt    • Breast cancer Maternal Aunt         age unknown   • Colon polyps Neg Hx    • Colon cancer Neg Hx       Current Medications:     No current outpatient medications on file.      No current facility-administered medications for this visit. Allergies:     No Known Allergies   Physical Exam:     /62 (BP Location: Right arm, Patient Position: Sitting, Cuff Size: Standard)   Pulse 58   Temp 98.4 °F (36.9 °C) (Tympanic)   Ht 5' 5" (1.651 m)   Wt 66.2 kg (146 lb)   SpO2 98%   BMI 24.30 kg/m²     Physical Exam  Vitals and nursing note reviewed. Constitutional:       General: She is not in acute distress. Appearance: Normal appearance. She is well-developed and normal weight. HENT:      Head: Normocephalic and atraumatic. Right Ear: Ear canal and external ear normal.      Left Ear: Ear canal and external ear normal.      Ears:      Comments: Bilateral TMs with scarring       Nose: Nose normal.      Mouth/Throat:      Mouth: Mucous membranes are moist.      Pharynx: Oropharynx is clear. Eyes:      Extraocular Movements: Extraocular movements intact. Conjunctiva/sclera: Conjunctivae normal.      Pupils: Pupils are equal, round, and reactive to light. Neck:      Thyroid: No thyromegaly or thyroid tenderness. Cardiovascular:      Rate and Rhythm: Regular rhythm. Bradycardia present. Pulses:           Radial pulses are 2+ on the right side and 2+ on the left side. Heart sounds: Normal heart sounds. No murmur heard. Pulmonary:      Effort: Pulmonary effort is normal. No respiratory distress. Breath sounds: Normal breath sounds. Abdominal:      General: Abdomen is flat. Bowel sounds are normal.      Palpations: Abdomen is soft. Tenderness: There is no abdominal tenderness. Musculoskeletal:      Cervical back: Neck supple. Right lower leg: No edema. Left lower leg: No edema. Skin:     General: Skin is warm and dry. Neurological:      General: No focal deficit present. Mental Status: She is alert and oriented to person, place, and time.    Psychiatric:         Mood and Affect: Mood normal.         Behavior: Behavior normal.          KADE Reid  [facility]

## 2023-08-19 LAB
ALBUMIN SERPL-MCNC: 4.7 G/DL (ref 3.8–4.9)
ALBUMIN/GLOB SERPL: 2.2 {RATIO} (ref 1.2–2.2)
ALP SERPL-CCNC: 102 IU/L (ref 44–121)
ALT SERPL-CCNC: 15 IU/L (ref 0–32)
AST SERPL-CCNC: 24 IU/L (ref 0–40)
BASOPHILS # BLD AUTO: 0.1 X10E3/UL (ref 0–0.2)
BASOPHILS NFR BLD AUTO: 1 %
BILIRUB SERPL-MCNC: 1.2 MG/DL (ref 0–1.2)
BUN SERPL-MCNC: 13 MG/DL (ref 6–24)
BUN/CREAT SERPL: 18 (ref 9–23)
CALCIUM SERPL-MCNC: 9.8 MG/DL (ref 8.7–10.2)
CHLORIDE SERPL-SCNC: 102 MMOL/L (ref 96–106)
CHOLEST SERPL-MCNC: 245 MG/DL (ref 100–199)
CHOLEST/HDLC SERPL: 2.4 RATIO (ref 0–4.4)
CO2 SERPL-SCNC: 24 MMOL/L (ref 20–29)
CREAT SERPL-MCNC: 0.73 MG/DL (ref 0.57–1)
EGFR: 96 ML/MIN/1.73
EOSINOPHIL # BLD AUTO: 0.1 X10E3/UL (ref 0–0.4)
EOSINOPHIL NFR BLD AUTO: 2 %
ERYTHROCYTE [DISTWIDTH] IN BLOOD BY AUTOMATED COUNT: 12.1 % (ref 11.7–15.4)
GLOBULIN SER-MCNC: 2.1 G/DL (ref 1.5–4.5)
GLUCOSE SERPL-MCNC: 83 MG/DL (ref 70–99)
HCT VFR BLD AUTO: 41.6 % (ref 34–46.6)
HCV AB S/CO SERPL IA: NON REACTIVE
HDLC SERPL-MCNC: 102 MG/DL
HGB BLD-MCNC: 14 G/DL (ref 11.1–15.9)
IMM GRANULOCYTES # BLD: 0 X10E3/UL (ref 0–0.1)
IMM GRANULOCYTES NFR BLD: 0 %
LDLC SERPL CALC-MCNC: 136 MG/DL (ref 0–99)
LYMPHOCYTES # BLD AUTO: 1.9 X10E3/UL (ref 0.7–3.1)
LYMPHOCYTES NFR BLD AUTO: 32 %
MCH RBC QN AUTO: 30.9 PG (ref 26.6–33)
MCHC RBC AUTO-ENTMCNC: 33.7 G/DL (ref 31.5–35.7)
MCV RBC AUTO: 92 FL (ref 79–97)
MONOCYTES # BLD AUTO: 0.4 X10E3/UL (ref 0.1–0.9)
MONOCYTES NFR BLD AUTO: 6 %
NEUTROPHILS # BLD AUTO: 3.5 X10E3/UL (ref 1.4–7)
NEUTROPHILS NFR BLD AUTO: 59 %
PLATELET # BLD AUTO: 304 X10E3/UL (ref 150–450)
POTASSIUM SERPL-SCNC: 4.4 MMOL/L (ref 3.5–5.2)
PROT SERPL-MCNC: 6.8 G/DL (ref 6–8.5)
RBC # BLD AUTO: 4.53 X10E6/UL (ref 3.77–5.28)
SL AMB VLDL CHOLESTEROL CALC: 7 MG/DL (ref 5–40)
SODIUM SERPL-SCNC: 138 MMOL/L (ref 134–144)
TRIGL SERPL-MCNC: 45 MG/DL (ref 0–149)
TSH SERPL DL<=0.005 MIU/L-ACNC: 1.45 UIU/ML (ref 0.45–4.5)
WBC # BLD AUTO: 6 X10E3/UL (ref 3.4–10.8)

## 2023-08-21 ENCOUNTER — HOSPITAL ENCOUNTER (OUTPATIENT)
Dept: ULTRASOUND IMAGING | Facility: CLINIC | Age: 58
Discharge: HOME | End: 2023-08-21
Payer: COMMERCIAL

## 2023-08-21 ENCOUNTER — TELEPHONE (OUTPATIENT)
Dept: OBGYN CLINIC | Facility: CLINIC | Age: 58
End: 2023-08-21

## 2023-08-21 ENCOUNTER — TELEPHONE (OUTPATIENT)
Dept: FAMILY MEDICINE CLINIC | Facility: CLINIC | Age: 58
End: 2023-08-21

## 2023-08-21 DIAGNOSIS — R92.8 ABNORMAL SCREENING MAMMOGRAM: ICD-10-CM

## 2023-08-21 PROCEDURE — 76642 ULTRASOUND BREAST LIMITED: CPT

## 2023-08-21 NOTE — TELEPHONE ENCOUNTER
----- Message from Harrison Tineo DO sent at 8/21/2023  3:36 PM EDT -----  Please call the patient inform breast ultrasound reveals cysts noted, recommend breast ultrasound follow-up 6 months to assure stability.

## 2023-08-21 NOTE — TELEPHONE ENCOUNTER
----- Message from Jaleesa Meng, [address] [facility] sent at 8/20/2023  8:58 PM EDT -----  Jesus Norwood, Your labs show an elevation in your cholesterol levels. Your HDL is great though at 102.  Recommend mediterranean diet, recheck in 6 months, other remaining labs including thyroid and hep c screening were normal

## 2023-08-21 NOTE — TELEPHONE ENCOUNTER
Patient informed of mammogram results 8/17/2022 & recommended follow up to have diagnostic (R) breast ultrasound - rad order already in patient's chart. Patient will schedule appointment time for same.

## 2023-08-21 NOTE — RESULT ENCOUNTER NOTE
Cm Sultana Marrow, Your labs show an elevation in your cholesterol levels. Your HDL is great though at 102.  Recommend mediterranean diet, recheck in 6 months, other remaining labs including thyroid and hep c screening were normal

## 2023-09-11 ENCOUNTER — OFFICE VISIT (OUTPATIENT)
Dept: PODIATRY | Facility: CLINIC | Age: 58
End: 2023-09-11
Payer: COMMERCIAL

## 2023-09-11 DIAGNOSIS — M72.2 PLANTAR FASCIITIS: Primary | ICD-10-CM

## 2023-09-11 DIAGNOSIS — M77.32 CALCANEAL SPUR, LEFT: ICD-10-CM

## 2023-09-11 PROCEDURE — L3000 FT INSERT UCB BERKELEY SHELL: HCPCS | Performed by: PODIATRIST

## 2023-10-17 ENCOUNTER — TELEPHONE (OUTPATIENT)
Age: 58
End: 2023-10-17

## 2023-10-17 NOTE — TELEPHONE ENCOUNTER
Caller: Patient    Doctor: Dimple Slight    Reason for call: Wants another set of orthotic inserts made. Does she have to make an appointment or can we just go ahead and make them for her? She recently got a pair made. Can we use the same mold? Please advise.       Call back#: [phone]

## 2023-12-27 ENCOUNTER — ANNUAL EXAM (OUTPATIENT)
Dept: OBGYN CLINIC | Facility: CLINIC | Age: 58
End: 2023-12-27
Payer: COMMERCIAL

## 2023-12-27 VITALS
BODY MASS INDEX: 25.23 KG/M2 | SYSTOLIC BLOOD PRESSURE: 118 MMHG | DIASTOLIC BLOOD PRESSURE: 68 MMHG | HEIGHT: 64 IN | WEIGHT: 147.8 LBS

## 2023-12-27 DIAGNOSIS — Z01.419 ENCOUNTER FOR ANNUAL ROUTINE GYNECOLOGICAL EXAMINATION: Primary | ICD-10-CM

## 2023-12-27 DIAGNOSIS — Z12.31 ENCOUNTER FOR SCREENING MAMMOGRAM FOR MALIGNANT NEOPLASM OF BREAST: ICD-10-CM

## 2023-12-27 PROCEDURE — G0476 HPV COMBO ASSAY CA SCREEN: HCPCS | Performed by: OBSTETRICS & GYNECOLOGY

## 2023-12-27 PROCEDURE — G0145 SCR C/V CYTO,THINLAYER,RESCR: HCPCS | Performed by: OBSTETRICS & GYNECOLOGY

## 2023-12-27 PROCEDURE — S0612 ANNUAL GYNECOLOGICAL EXAMINA: HCPCS | Performed by: OBSTETRICS & GYNECOLOGY

## 2023-12-27 NOTE — PROGRESS NOTES
Assessment/Plan:      Pap smear done as well as annual.  Encouraged self breast examination as well as calcium supplementation.   she is scheduled for follow-up right breast ultrasound 3/2024.  Reviewed colon cancer screening, up-to-date.  She will continue to follow-up with primary care as scheduled.  Return to office in 1 year or as needed  No problem-specific Assessment & Plan notes found for this encounter.       Diagnoses and all orders for this visit:    Encounter for annual routine gynecological examination    Encounter for screening mammogram for malignant neoplasm of breast    Other orders  -     LUTEIN PO; Take by mouth  -     Omega-3 Fatty Acids (FISH OIL PO); Take by mouth  -     Ascorbic Acid (VITAMIN C PO); Take by mouth  -     Multiple Vitamins-Minerals (VITAMIN-MINERAL SUPPLEMENT PO); Take by mouth          Subjective:      Patient ID: Char Winn is a 58 y.o. female.    HPI        Pleasant 58-year-old female P3 ( x 1,  x 2, age 28, 27, 25) presents for her GYN exam.  She went through menopause at age 49.  She has never been on hormone replacement therapy.  She denies any vaginal bleeding or spotting.,  No changes in bowel or bladder function.  She has been in a monogamous relationship for over 32 years.  Pap smears have been normal.    2023 mammo/US f/u US rt br 6 months    Colon 2021 f/u 5  She has been exercising regularly including Pilates and yoga.  She feels help with any possible mild pelvic floor prolapse.  She has had no further symptoms.      The following portions of the patient's history were reviewed and updated as appropriate: allergies, current medications, past family history, past medical history, past social history, past surgical history, and problem list.    Review of Systems   Constitutional:  Negative for fatigue, fever and unexpected weight change.   Respiratory:  Negative for cough, chest tightness, shortness of breath and wheezing.    Cardiovascular:  "Negative.  Negative for chest pain and palpitations.   Gastrointestinal: Negative.  Negative for abdominal distention, abdominal pain, blood in stool, constipation, diarrhea, nausea and vomiting.   Genitourinary: Negative.  Negative for difficulty urinating, dyspareunia, dysuria, flank pain, frequency, genital sores, hematuria, pelvic pain, urgency, vaginal bleeding, vaginal discharge and vaginal pain.   Skin:  Negative for rash.         Objective:      /68   Ht 5' 4\" (1.626 m)   Wt 67 kg (147 lb 12.8 oz)   BMI 25.37 kg/m²          Physical Exam  Constitutional:       Appearance: Normal appearance. She is well-developed.   HENT:      Head: Normocephalic and atraumatic.   Cardiovascular:      Rate and Rhythm: Normal rate and regular rhythm.   Pulmonary:      Effort: Pulmonary effort is normal.      Breath sounds: Normal breath sounds.   Chest:   Breasts:     Right: No inverted nipple, mass, nipple discharge, skin change or tenderness.      Left: No inverted nipple, mass, nipple discharge, skin change or tenderness.   Abdominal:      General: Bowel sounds are normal. There is no distension.      Palpations: Abdomen is soft.      Tenderness: There is no abdominal tenderness. There is no guarding or rebound.   Genitourinary:     Labia:         Right: No rash, tenderness or lesion.         Left: No rash, tenderness or lesion.       Vagina: Normal. No signs of injury. No vaginal discharge or tenderness.      Cervix: No cervical motion tenderness, discharge, friability, lesion or cervical bleeding.      Uterus: Not enlarged and not tender.       Adnexa:         Right: No mass, tenderness or fullness.          Left: No mass, tenderness or fullness.     Neurological:      Mental Status: She is alert.   Psychiatric:         Behavior: Behavior normal.           "

## 2024-01-03 LAB
LAB AP GYN PRIMARY INTERPRETATION: NORMAL
Lab: NORMAL

## 2024-01-23 ENCOUNTER — TELEPHONE (OUTPATIENT)
Age: 59
End: 2024-01-23

## 2024-01-23 NOTE — TELEPHONE ENCOUNTER
Caller: Char Winn    Doctor/Office: /Dl    #: [phone]    Escalation: Patient would like to set up an appt for orthotics. Please return call. She is a  so she has limited time to be on her phone during the day and is requesting a direct line be left in case you can't reach her. Thank you

## 2024-01-24 DIAGNOSIS — M72.2 PLANTAR FASCIITIS: Primary | ICD-10-CM

## 2024-02-20 ENCOUNTER — TELEPHONE (OUTPATIENT)
Dept: OBGYN CLINIC | Facility: CLINIC | Age: 59
End: 2024-02-20

## 2024-02-20 NOTE — TELEPHONE ENCOUNTER
LM for patient regarding scheduling for orthotics. Left number [phone] for call back. I did say I can call her after 3 pm today due to patient is working during the day. I also asked if she was interested in a duplicate pair of orthotics or if she would want to be re casted for the inserts.

## 2024-02-26 ENCOUNTER — HOSPITAL ENCOUNTER (OUTPATIENT)
Dept: ULTRASOUND IMAGING | Facility: CLINIC | Age: 59
Discharge: HOME | End: 2024-02-26
Payer: COMMERCIAL

## 2024-02-26 DIAGNOSIS — R92.8 ABNORMAL FINDINGS ON DIAGNOSTIC IMAGING OF BREAST: ICD-10-CM

## 2024-02-26 PROCEDURE — 76642 ULTRASOUND BREAST LIMITED: CPT

## 2024-07-01 ENCOUNTER — OFFICE VISIT (OUTPATIENT)
Dept: OBGYN CLINIC | Facility: CLINIC | Age: 59
End: 2024-07-01
Payer: COMMERCIAL

## 2024-07-01 VITALS
SYSTOLIC BLOOD PRESSURE: 124 MMHG | WEIGHT: 149.4 LBS | BODY MASS INDEX: 25.51 KG/M2 | DIASTOLIC BLOOD PRESSURE: 72 MMHG | HEIGHT: 64 IN

## 2024-07-01 DIAGNOSIS — N81.83 WEAKENING OF RECTOVAGINAL TISSUE: ICD-10-CM

## 2024-07-01 DIAGNOSIS — N95.2 VAGINAL ATROPHY: Primary | ICD-10-CM

## 2024-07-01 DIAGNOSIS — N39.46 MIXED STRESS AND URGE URINARY INCONTINENCE: ICD-10-CM

## 2024-07-01 PROCEDURE — 99214 OFFICE O/P EST MOD 30 MIN: CPT | Performed by: OBSTETRICS & GYNECOLOGY

## 2024-07-01 RX ORDER — ESTRADIOL 0.1 MG/G
0.5 CREAM VAGINAL 2 TIMES WEEKLY
Qty: 42.5 G | Refills: 0 | Status: SHIPPED | OUTPATIENT
Start: 2024-07-01

## 2024-07-01 NOTE — PROGRESS NOTES
Ambulatory Visit  Name: Char Winn      : 1965      MRN: [id number]  Encounter Provider: Samantha Baez DO  Encounter Date: 2024   [facility]    Assessment & Plan   1. Vaginal atrophy  -     estradiol (ESTRACE VAGINAL) 0.1 mg/g vaginal cream; Insert 0.5 g into the vagina 2 (two) times a week  2. Weakening of rectovaginal tissue  -     Ambulatory Referral to Physical Therapy; Future  3. Mixed stress and urge urinary incontinence  -     Ambulatory Referral to Physical Therapy; Future    Discussed treatment options for vaginal atrophy, urinary incontinence.  Discussed the risks and benefits of vaginal estrogen.  Also recommend further evaluation with pelvic floor physical therapy.  Recommend improving constipation symptoms.  All questions answered.  She will call with update.  Return to office in 6 months for annual visit or as needed    History of Present Illness     Char Winn is a 58 y.o. female who presents     This is a pleasant 58-year-old female P3 ( x 1,  x 2) presents complaining of increased pelvic floor pressure, lower back pain.  She denies any bleeding or spotting.  No vaginal discharge.  She complains of urinary incontinence associated with urgency as well as stress induced.  She has been exercising regularly including yoga to prevent pelvic floor prolapse.  She states her job is very physically demanding.  She is also had episodes of constipation with pushing and straining.  She has increased her fiber and water intake.    Review of Systems   Constitutional:  Negative for fatigue, fever and unexpected weight change.   Respiratory:  Negative for cough, chest tightness, shortness of breath and wheezing.    Cardiovascular: Negative.  Negative for chest pain and palpitations.   Gastrointestinal: Negative.  Negative for abdominal distention, abdominal pain, blood in stool, constipation, diarrhea, nausea and vomiting.   Genitourinary:  "Negative.  Negative for difficulty urinating, dyspareunia, dysuria, flank pain, frequency, genital sores, hematuria, pelvic pain, urgency, vaginal bleeding, vaginal discharge and vaginal pain.   Skin:  Negative for rash.     Current Outpatient Medications on File Prior to Visit   Medication Sig Dispense Refill    Ascorbic Acid (VITAMIN C PO) Take by mouth      LUTEIN PO Take by mouth      Omega-3 Fatty Acids (FISH OIL PO) Take by mouth      Multiple Vitamins-Minerals (VITAMIN-MINERAL SUPPLEMENT PO) Take by mouth (Patient not taking: Reported on 7/1/2024)       No current facility-administered medications on file prior to visit.      Objective     /72   Ht 5' 4\" (1.626 m)   Wt 67.8 kg (149 lb 6.4 oz)   BMI 25.64 kg/m²     Physical Exam  Abdominal:      General: Bowel sounds are normal. There is no distension.      Palpations: Abdomen is soft.      Tenderness: There is no abdominal tenderness. There is no guarding.   Genitourinary:     Labia:         Right: No rash, tenderness or lesion.         Left: No rash, tenderness or lesion.       Vagina: No signs of injury. No vaginal discharge or tenderness.      Cervix: No cervical motion tenderness, discharge, friability, lesion, erythema or cervical bleeding.     Mild to moderate rectocele noted, mild cystocele.  The vagina is evident of estrogen deficiency.    Administrative Statements   I have spent a total time of 30 minutes in caring for this patient on the day of the visit/encounter including Prognosis, Risks and benefits of tx options, Instructions for management, Importance of tx compliance, Impressions, Counseling / Coordination of care, Documenting in the medical record, Reviewing / ordering tests, medicine, procedures  , and Obtaining or reviewing history  .        "

## 2024-07-03 ENCOUNTER — EVALUATION (OUTPATIENT)
Dept: PHYSICAL THERAPY | Facility: REHABILITATION | Age: 59
End: 2024-07-03
Payer: COMMERCIAL

## 2024-07-03 DIAGNOSIS — N81.83 WEAKENING OF RECTOVAGINAL TISSUE: ICD-10-CM

## 2024-07-03 DIAGNOSIS — N39.46 MIXED STRESS AND URGE URINARY INCONTINENCE: ICD-10-CM

## 2024-07-03 PROCEDURE — 97110 THERAPEUTIC EXERCISES: CPT

## 2024-07-03 PROCEDURE — 97162 PT EVAL MOD COMPLEX 30 MIN: CPT

## 2024-07-03 NOTE — PROGRESS NOTES
"PT Evaluation     Today's date: 7/3/2024  Patient name: Char Winn  : 1965  MRN: [id number]  Referring provider: Samantha Baez DO  Dx:   Encounter Diagnosis     ICD-10-CM    1. Weakening of rectovaginal tissue  N81.83 Ambulatory Referral to Physical Therapy      2. Mixed stress and urge urinary incontinence  N39.46 Ambulatory Referral to Physical Therapy             Chief Complaint: Bulging - wants to ensure current activities are not worsening the symptoms.   HPI: Pt presents to PT noting onset of bulging beginning around one year ago. Patient also reports constipation. Patient also notes a recent onset of lower back pain in the past months, noting gradual onset of a \"dull ache\" with no DENI. Patient verbalizes she is involved in heavy lifting (lifting in the yard and preserving her 8 acre land).   Occupation: Teacher  Social History: In a house -   Preferred language/communication style:  English.   PMH: Reviewed.         Specific symptom history:    Urinary:     Onset: Over a year ago  Symptoms Present:     Urinary Frequency  Urge Frequency  Delayed urination onset (patient feels when she has to go, the symptoms are delayed).   Denies:           Dysuria and Frequent UTI  Daytime void interval: Every 2 hours.   Uses of liners/pads/diapers? No  Frequency of leakage: 5-10x per day.   Fluid intake:     Water Two 20 oz bottles per day    Tea Green tea - couple of cups a tea (makes a thermus of this).               Bowel: 6 months ago - significant constipation onset. Patient denies any significant reason for onset of symptoms or change in schedule. Noted some reduction with raison brand consumption.     Defecates 1x/day .  Constipation/Straining?: History of constipation.   Use of Squatty Potty?: Patient uses a small stool.      Nutrition:   Breakfast: [employer] brand cereal for breakfast  Lunch: Yogurt for lunch and a snack  Dinner: Soup, Stew. Minimal amounts of meat (not the main course).    " "  GYN:      with  delivery and vaginal deliveries. Patient had one forcep delivery.   Sees GYN regularly? Annually  Children: Ages 25, 27, 28.   Time spent actively pushing: Second child was forceps delivery, and third child was quick delivery.   Menopausal: Age late forties.    Sexual Function:     Currently sexually active? Yes  Sexual dysfunction? No  History of sexual trauma/abuse? No   MSK:      History of abdominal surgery?   Current exercise: Patient has been participating in gentle pilates in the morning ([employer]). Working in the woods.                        Systems Review History:  Cardiovascular/pulmonary  Patient denies   Integumentary    Musculoskeletal Location: Lumbar low back pain. Patient denies any referral down the leg.   Aggravating Factors: Patient cannot recall specific onset of what is aggravating her back pain.    Neuromuscular System Any recent falls? No  Numbness/tingling? No     Surgical history and/or abdominal surgeries:  Patient denies       Goal: \"I want to keep doing what I am doing without any damage.\"            Assessment  Impairments: abnormal coordination, abnormal muscle firing, abnormal muscle tone, activity intolerance and impaired physical strength  Symptom irritability: moderate    Assessment details:    Char Winn is a 58 y.o. year old female presenting to outpatient pelvic health physical therapy with a referral from provider for Weakening of rectovaginal tissue and mixed stress and urge urinary incontinence. Patient reports increased symptom onset beginning one year ago. Patient's chief complaint is \"bulging.\"  Internal Assessment performed today with patient verbal and written consent. Patient was provided education on the purpose and techniques of the internal examination. Offered patient another individual in the room, of which the patient denied. Educated the patient that the exam can be terminated at any time, of which, the patient did not " request.  Examination reveals reduced diaphragmatic breathing, delayed TrA (transverse abdominis) engagement, posterior vaginal laxity, and reduced perineal mobility cephalad.  S/S consistent with reduced pressure management and PFM dysfunction. Patient will benefit from skilled PT services to address these deficits and improve appropriate pressure management to improve function and limit regression. Based on patients age and motivation, patient is a positive candidate to respond favorably to physical therapy with a good prognosis.  Patient was educated on examination and techniques, plan of care, goals of therapy, and HEP. Patient was provided an opportunity to ask any questions. Provided Pt initial HEP. Patient demonstrated and verbalized understanding. Verbally reported to hold exercises if increased pain or discomfort.  Pt will be seen 1-2x/week for 6-12 weeks. Patient will be re-assessed regularly in order to document progress and limit any regression. The goal of PT is to progress patient towards independence of self care management.     Understanding of Dx/Px/POC: good     Prognosis: good    Goals  Dysfunction:   In 4 weeks, patient will demonstrate improved PFM strength to at least 3+/5 B/L to assist with exercise progression against gravity.   In 4 weeks, patient will demonstrate improved PFM endurance to 6 sec of 3/5 strength or greater.   In 6 weeks, patient will appropriately engage TrA in both hooklying, standing, quadraped, and seated to assist with PFM strength and limited bulging.   In 6 weeks, patient will engage PFM with 3+/5 strength, with coordination of TrA and PFM concentric activation.       Bowel:  In 4 weeks, patient will report improvement in defecatory function as evidenced by no episodes of constipation in one week.  In 6 weeks, patient will report no episodes of straining during defecation.     Function:  In 6 weeks, patient will report improvement in lifting tolerance with reducing  bulging > 75% when lifting trees in yard.   In 8 weeks, patient will report 75% improvement in general symptoms  In 10 weeks, patient will  report a <50% improvement in PFDI20 to improve function.     Pelvic Floor Subjective     Objective     Functional Assessment        Comments  Lifting mechanics: Educated for inhalation with descent, PFM and TrA engagement and exhalation with rise. Increased instruction provided on appropriate breathing mechanics to limit increased pelvic pressure. Patient lifts heavy trees and PT educated appropriately appropriate lifting and breathing mechanics for ADL.       Abdominal Assessment:      Abdominal Assessment: Abdominal Tenderness:  Patient denies any abdominal tenderness in both superior and inferior quadrants.     Breathing Mechanics:   Reduced AP diaphragmatic excursion, along with reduced lateral diaphragmatic excursion.     Hooklying deep TrA engagement Hooklying:  Delayed TrA activation, improvement noted with appropriate tactile cueing.           Diastatis   Diastasis recti present? no  no tenderness at linea alba  able to engage transverse abdominis       General Perineum Exam:   Positive for no pelvic organ prolapse at rest.     Visual Inspection of Perineum:   Involuntary contraction with coughing: yes  Involuntary relaxation with bearing down: yes  Cotton swab test: non-tender  Sphincter Tone Squeeze: normal  Sensation: intact  Tenderness: unprovoked    Pelvic Organ Prolapse   At rest: none  With bearing down: severe (1 cm beyond the level of hymenal remnants)  Perineal body inspection: within normal limits        Pelvic Floor Muscle Exam:     Muscle Contraction: overflow   Breathing pattern with contraction: apical   Pelvic floor muscle relaxation is complete.         PERFECT Score   Power right: 2+/5   Power left: 3/5   Endurance (seconds to max): 4   Repetitions (before fatigue): 4   Fast flicks (in 10 seconds): 5   Perfect Score: Internal Assessment Performed today  "with patient verbal and written consent. Patient was provided education on the purpose and techniques of the internal examination. Offered patient another individual in the room, of which the patient denied. Educated the patient that the exam can be terminated at any time, of which, the patient did not request.      Objective internal assessment: Received verbal consent from patient prior to insertion of gloved finger into the introitus. Insertion of finger toward DIP making note of introitus mobility, noting normal posterior and lateral movement. Gentle sweep along layer one of pelvic from right to left noted WNL sensation, and no significant tenderness. Patient tolerated deeper insertion toward IP. Lateral assessment of right pelvic floor along  4:00 position (levator ani). Noted 2+/5. Localized ATLA and furthered assessment with  palpation of right obturator internus around 2:00 of right PFM. Noted 2+/5. Attention turned towards left PFM.  Assessment furthered with localizing the ATLA and moving toward 8:00 to test at levator Ani. Noted 3/5. From levator ani, moved toward obturator internus at approximately 10:00. Noted 3/5. Noted slight TTP along left obturator internus. Examination terminated with instruction to patient that I was going to remove my gloved finger.     Overall pelvic assessment - noted increased posterior laxity. Noted reduced PFM \"lift\" B/L. Patient will benefit from further skilled PT services to assist with addressing lift component of PFM.            pelvic floor exam consent given by patient    Pelvic exam completed: vaginally            Precautions: Standard    Chief Complaint    Patient Subjective includes:  constipation and straining  Bulging    Patient Goals   Appropriate education to limit further bulging onset.    Objective Impairments to address   Pelvic Floor Strength    Power 2+/5 and 3/5   Endurance  4   Fast Twitch  5   % relaxation full   External Hip Strength Screen next visit. "        Lumbar Screen          PLAN OF CARE EXPIRATION: 10/03/2024    AUTHORIZATION EXPIRATION:     Date Authorization     Number of visits provided        Date 07/03/2024        Visit Number                  Neuro Re-Ed         Urge deferral         Breathing Mechanics         PFM Coordination         PFM Down Training                            Ther Ex         PFM Strengthening         Hip strengthening         Functional Strengthening Lifting #15 lb from 8 inch step with appropriate breathing mechanics  2 x 10         Dilator Training         Core Progression Standing forearm on wall   1 x 12     Hooklying TrA activation  1 x 10         Breathing Mechanics Sidelying expansion   1 x 8         Ther Activity         PFM Education Use of external pelvic model, POC, HEP, lifting mechanics        Voiding Diaries          Defecation Mechanics                  Manual Ther         PFM exam         Ortho exam         PFM Manuals                           Modalities                           Patient Education         Fluid Intake

## 2024-07-17 ENCOUNTER — OFFICE VISIT (OUTPATIENT)
Dept: PHYSICAL THERAPY | Facility: REHABILITATION | Age: 59
End: 2024-07-17
Payer: COMMERCIAL

## 2024-07-17 DIAGNOSIS — N81.83 WEAKENING OF RECTOVAGINAL TISSUE: Primary | ICD-10-CM

## 2024-07-17 DIAGNOSIS — N39.46 MIXED STRESS AND URGE URINARY INCONTINENCE: ICD-10-CM

## 2024-07-17 PROCEDURE — 97530 THERAPEUTIC ACTIVITIES: CPT

## 2024-07-17 PROCEDURE — 97110 THERAPEUTIC EXERCISES: CPT

## 2024-07-17 NOTE — PROGRESS NOTES
Daily Note     Today's date: 2024  Patient name: Char Winn  : 1965  MRN: [id number]  Referring provider: Samantha Baez DO  Dx:   Encounter Diagnosis     ICD-10-CM    1. Weakening of rectovaginal tissue  N81.83       2. Mixed stress and urge urinary incontinence  N39.46               1:1 60 minute session.        Subjective: Patient reports compliance with exercises she could remember. Patient denies any significant changes at this time.       Objective: See treatment diary below      Assessment: Tolerated treatment well. Progressed pressure management today, along with PFM concentric coordination to limit bulging.  Patient required verbal cueing for MOD # of the time for appropriate breathing coordination and exhalation with coordination, along with tactile cueing for limited pelvic rotation during clamshell and hip abduction. Introduced education regarding defecation mechanics, along with PFM eccentric lengthening to assist with appropriate defecation mechanics. Observation during treatment noted positive response to repetition.  Overall, patient requires continued physical therapy services to assist with further pressure management, diaphragmatic breathing, and functional application to limit further symptom onset as patient participates in heavy lifting around her house . Home exercise program updated today, advising patient to discontinue with any increased pain or discomfort. Patient was agreeable.  Patient would benefit from continued PT      Plan: Continue per plan of care.      Precautions: Standard      Access Code: [email or web address]  URL: [email or web address]  Date: 2024  Prepared by: Karla Winchester    Program Notes  Laying on back, feet on wall: Lift hips up as you exhale and perform a kegal. Inhale on the way down. Inhale on the way down with a squat, exhale on the way up!Exhale with exertion!    Exercises  - Supine Transversus Abdominis Bracing - Hands on Stomach  - 1  x daily - 7 x weekly - 1 sets - 10 reps  - Standing Transverse Abdominis Contraction  - 1 x daily - 7 x weekly - 1 sets - 10 reps  - Sit to Stand  - 1 x daily - 7 x weekly - 1 sets - 10 reps  - Supine Diaphragmatic Breathing  - 1 x daily - 7 x weekly - 1 sets - 10 reps  - Sidelying Diaphragmatic Breathing  - 1 x daily - 7 x weekly - 1 sets - 10 reps  - Supine Bridge  - 1 x daily - 7 x weekly - 1 sets - 10 reps  - Clamshell with Resistance  - 1 x daily - 7 x weekly - 2 sets - 10 reps  - Squat  - 1 x daily - 7 x weekly - 1 sets - 10 reps  Chief Complaint      Patient Subjective includes:  constipation and straining  Bulging    Patient Goals   Appropriate education to limit further bulging onset.    Objective Impairments to address   Pelvic Floor Strength    Power 2+/5 and 3/5   Endurance  4   Fast Twitch  5   % relaxation full   External Hip Strength Screen next visit.        Lumbar Screen          PLAN OF CARE EXPIRATION: 10/03/2024    AUTHORIZATION EXPIRATION:     Date Authorization     Number of visits provided        Date 07/03/2024 7/17/2024       Visit Number                  Neuro Re-Ed         Urge deferral         Breathing Mechanics         PFM Coordination         PFM Down Training                            Ther Ex         PFM Strengthening  Bridge with abduction and PFM concentric activation  1 x 10 YTB    Hooklying feet on wall with hip extension and PFM concentric activation   1  x 10     Hooklying ball squeeze with PFM engagement  1 x 10 concentric activation          Hip strengthening  Clamshell with TB above knees  1 x 15    Sidelying hip abduction   1 x 15 B/L       Functional Strengthening Lifting #15 lb from 8 inch step with appropriate breathing mechanics  2 x 10  Hinge mechanics at wall   1 x 15     Lifting anterior squat #20 lb from 8 inch  3 x 10        Dilator Training         Core Progression Standing forearm on wall   1 x 12     Hooklying TrA activation  1 x 10  Hooklying march   1 x 8      Pallof Press  1 x 15 B/L #15 lb        Breathing Mechanics Sidelying expansion   1 x 8         Ther Activity         PFM Education Use of external pelvic model, POC, HEP, lifting mechanics Education regarding appropriate defecation mechanics. Provided squatty potty, walking through C/R/BD to assist with PFM eccentirc lengthening.    Education regarding prolapse with visual picture/diagram       Voiding Diaries          Defecation Mechanics                  Manual Ther         PFM exam         Ortho exam         PFM Manuals                           Modalities                           Patient Education         Fluid Intake

## 2024-07-29 ENCOUNTER — APPOINTMENT (OUTPATIENT)
Dept: PHYSICAL THERAPY | Facility: REHABILITATION | Age: 59
End: 2024-07-29
Payer: COMMERCIAL

## 2024-08-02 ENCOUNTER — OFFICE VISIT (OUTPATIENT)
Dept: PHYSICAL THERAPY | Facility: REHABILITATION | Age: 59
End: 2024-08-02
Payer: COMMERCIAL

## 2024-08-02 DIAGNOSIS — N39.46 MIXED STRESS AND URGE URINARY INCONTINENCE: Primary | ICD-10-CM

## 2024-08-02 DIAGNOSIS — N81.83 WEAKENING OF RECTOVAGINAL TISSUE: ICD-10-CM

## 2024-08-02 PROCEDURE — 97110 THERAPEUTIC EXERCISES: CPT

## 2024-08-02 NOTE — PROGRESS NOTES
"Daily Note     Today's date: 2024  Patient name: Char Winn  : 1965  MRN: [id number]  Referring provider: Samantha Baez DO  Dx:   Encounter Diagnosis     ICD-10-CM    1. Mixed stress and urge urinary incontinence  N39.46       2. Weakening of rectovaginal tissue  N81.83                      Subjective: Patient reports compliance with the defecation technique. Patient also reports a reduction in general constipation and straining.      Objective: See treatment diary below      Assessment: Tolerated treatment well. Progressed continued pressure management today along with appropriate PFM and deep TrA synchrony today. Introduced further education, demonstration, and exercises to assist with appropriate lifting mechanics per patients hobbies and activities involved with gardening and lifting trees. Noted reduced posterior chain engagement with lifting #20 lb. Held weight and utilized part practice to assist with cueing to \"tap glutes to chair\" and then exhale and rise. Further time is required to progress back into lifting with weight. Patient displays increased anterior lean with lifting, which can impact pressure on PFM during lifting tasks.  Patient would benefit from continued PT to further progress appropriate lifting mechanics, minimize PFM descent, and improve PFM and dep core engagement.       Plan: Continue per plan of care.      Precautions: Standard      Access Code: [email or web address]  URL: [email or web address]  Date: 2024  Prepared by: Karla Winchester    Program Notes  Laying on back, feet on wall: Lift hips up as you exhale and perform a kegal. Inhale on the way down. Inhale on the way down with a squat, exhale on the way up!Exhale with exertion!    Exercises  - Supine Transversus Abdominis Bracing - Hands on Stomach  - 1 x daily - 7 x weekly - 1 sets - 10 reps  - Standing Transverse Abdominis Contraction  - 1 x daily - 7 x weekly - 1 sets - 10 reps  - Sit to Stand  - 1 x " daily - 7 x weekly - 1 sets - 10 reps  - Supine Diaphragmatic Breathing  - 1 x daily - 7 x weekly - 1 sets - 10 reps  - Sidelying Diaphragmatic Breathing  - 1 x daily - 7 x weekly - 1 sets - 10 reps  - Supine Bridge  - 1 x daily - 7 x weekly - 1 sets - 10 reps  - Clamshell with Resistance  - 1 x daily - 7 x weekly - 2 sets - 10 reps  - Squat  - 1 x daily - 7 x weekly - 1 sets - 10 reps  Chief Complaint      Patient Subjective includes:  constipation and straining  Bulging    Patient Goals   Appropriate education to limit further bulging onset.    Objective Impairments to address   Pelvic Floor Strength    Power 2+/5 and 3/5   Endurance  4   Fast Twitch  5   % relaxation full   External Hip Strength Screen next visit.        Lumbar Screen          PLAN OF CARE EXPIRATION: 10/03/2024    AUTHORIZATION EXPIRATION:     Date Authorization     Number of visits provided        Date 07/03/2024 7/17/2024 08/02/2024      Visit Number                  Neuro Re-Ed         Urge deferral         Breathing Mechanics         PFM Coordination         PFM Down Training                            Ther Ex         PFM Strengthening  Bridge with abduction and PFM concentric activation  1 x 10 YTB    Hooklying feet on wall with hip extension and PFM concentric activation   1  x 10     Hooklying ball squeeze with PFM engagement  1 x 10 concentric activation    Bridge with abduction and PFM concentric activation  1 x 10 RedTB    Hooklying feet on wall with hip extension and PFM concentric activation   1  x 10       Hip strengthening  Clamshell with TB above knees  1 x 15    Sidelying hip abduction   1 x 15 B/L Clamshell with TB above knees and TrA cueing   1 x 15 red TB      Functional Strengthening Lifting #15 lb from 8 inch step with appropriate breathing mechanics  2 x 10  Hinge mechanics at wall   1 x 15     Lifting anterior squat #20 lb from 8 inch  3 x 10  Tall kneel hip hinge holding yellow ball  1 x 12     Glute taps to mimic  squatting to assist with appropriate lifting mechanics. Chair behind patient  1 x 15       Dilator Training         Core Progression Standing forearm on wall   1 x 12     Hooklying TrA activation  1 x 10  Hooklying march   1 x 8     Pallof Press  1 x 15 B/L #15 lb  Plank  4 x 25 seconds    Pallof Press  1 x 15 B/L #15 lb      Breathing Mechanics Sidelying expansion   1 x 8   Hooklying   1 x 10 diaphragmatic breathing      Ther Activity         PFM Education Use of external pelvic model, POC, HEP, lifting mechanics Education regarding appropriate defecation mechanics. Provided squatty potty, walking through C/R/BD to assist with PFM eccentirc lengthening.    Education regarding prolapse with visual picture/diagram       Voiding Diaries          Defecation Mechanics                  Manual Ther         PFM exam         Ortho exam         PFM Manuals                           Modalities                           Patient Education         Fluid Intake

## 2024-08-05 ENCOUNTER — OFFICE VISIT (OUTPATIENT)
Dept: PHYSICAL THERAPY | Facility: REHABILITATION | Age: 59
End: 2024-08-05
Payer: COMMERCIAL

## 2024-08-05 DIAGNOSIS — N81.83 WEAKENING OF RECTOVAGINAL TISSUE: Primary | ICD-10-CM

## 2024-08-05 DIAGNOSIS — N39.46 MIXED STRESS AND URGE URINARY INCONTINENCE: ICD-10-CM

## 2024-08-05 PROCEDURE — 97110 THERAPEUTIC EXERCISES: CPT

## 2024-08-05 NOTE — PROGRESS NOTES
Daily Note     Today's date: 2024  Patient name: Char Winn  : 1965  MRN: [id number]  Referring provider: Samantha Baez DO  Dx:   Encounter Diagnosis     ICD-10-CM    1. Weakening of rectovaginal tissue  N81.83       2. Mixed stress and urge urinary incontinence  N39.46                      Subjective: Patient reports continued compliance with HEP and positive response with concentric PFM strengthening.       Objective: See treatment diary below      Assessment: Tolerated treatment well. Progressed appropriate PFM concentric activation utilizing gravity assist, of which patient responded well too. Introduced more progressive deep TrA strengthening today, noting difficulty and muscle fatigue with reformer therex. Utilized reformer for gravity minimized PFM strengthening, with tactile cueing for appropriate breathing mechanics. Session terminated following appropriate sit to stands holding weight to progress toward eventual lifting of weight from ground.  Patient would benefit from continued PT      Plan: Continue per plan of care.      Precautions: Standard      Access Code: [email or web address]  URL: [email or web address]  Date: 2024  Prepared by: Karla Winchester    Program Notes  Laying on back, feet on wall: Lift hips up as you exhale and perform a kegal. Inhale on the way down. Inhale on the way down with a squat, exhale on the way up!Exhale with exertion!    Exercises  - Supine Transversus Abdominis Bracing - Hands on Stomach  - 1 x daily - 7 x weekly - 1 sets - 10 reps  - Standing Transverse Abdominis Contraction  - 1 x daily - 7 x weekly - 1 sets - 10 reps  - Sit to Stand  - 1 x daily - 7 x weekly - 1 sets - 10 reps  - Supine Diaphragmatic Breathing  - 1 x daily - 7 x weekly - 1 sets - 10 reps  - Sidelying Diaphragmatic Breathing  - 1 x daily - 7 x weekly - 1 sets - 10 reps  - Supine Bridge  - 1 x daily - 7 x weekly - 1 sets - 10 reps  - Clamshell with Resistance  - 1 x daily  - 7 x weekly - 2 sets - 10 reps  - Squat  - 1 x daily - 7 x weekly - 1 sets - 10 reps  Chief Complaint      Patient Subjective includes:  constipation and straining  Bulging    Patient Goals   Appropriate education to limit further bulging onset.    Objective Impairments to address   Pelvic Floor Strength    Power 2+/5 and 3/5   Endurance  4   Fast Twitch  5   % relaxation full   External Hip Strength Screen next visit.        Lumbar Screen          PLAN OF CARE EXPIRATION: 10/03/2024    AUTHORIZATION EXPIRATION:     Date Authorization     Number of visits provided        Date 07/03/2024 7/17/2024 08/02/2024 8/05/2024     Visit Number                  Neuro Re-Ed         Urge deferral         Breathing Mechanics         PFM Coordination         PFM Down Training                            Ther Ex         PFM Strengthening  Bridge with abduction and PFM concentric activation  1 x 10 YTB    Hooklying feet on wall with hip extension and PFM concentric activation   1  x 10     Hooklying ball squeeze with PFM engagement  1 x 10 concentric activation    Bridge with abduction and PFM concentric activation  1 x 10 RedTB    Hooklying feet on wall with hip extension and PFM concentric activation   1  x 10  Bridge with abduction and PFM concentric activation  1 x 10 RedTB    Hooklying feet on wall with hip extension and PFM concentric activation   1  x 10      Hip strengthening  Clamshell with TB above knees  1 x 15    Sidelying hip abduction   1 x 15 B/L Clamshell with TB above knees and TrA cueing   1 x 15 red TB Clamshell with TB above knees wit TrA and with PFM and red TB above knees  1 x 15    Standing hip ER + extension with TB at ankles  2 x 15 BL      Functional Strengthening Lifting #15 lb from 8 inch step with appropriate breathing mechanics  2 x 10  Hinge mechanics at wall   1 x 15     Lifting anterior squat #20 lb from 8 inch  3 x 10  Tall kneel hip hinge holding yellow ball  1 x 12     Glute taps to mimic  squatting to assist with appropriate lifting mechanics. Chair behind patient  1 x 15  Tall kneel hip hinge holding yellow ball  1 x 12     Glute taps to mimic squatting to assist with appropriate lifting mechanics. Chair behind patient. HOLDING #10 lb weight   1 x 15      Dilator Training         Core Progression Standing forearm on wall   1 x 12     Hooklying TrA activation  1 x 10  Hooklying march   1 x 8     Pallof Press  1 x 15 B/L #15 lb  Plank  4 x 25 seconds    Pallof Press  1 x 15 B/L #15 lb Hooklying dying bug   1 x 5 B/L     Reformer:  Hooklying 90-90 hip flexion with B/L shoulder extension   2 x 10      Breathing Mechanics Sidelying expansion   1 x 8   Hooklying   1 x 10 diaphragmatic breathing      Ther Activity         PFM Education Use of external pelvic model, POC, HEP, lifting mechanics Education regarding appropriate defecation mechanics. Provided squatty potty, walking through C/R/BD to assist with PFM eccentirc lengthening.    Education regarding prolapse with visual picture/diagram       Voiding Diaries          Defecation Mechanics                  Manual Ther         PFM exam         Ortho exam         PFM Manuals                           Modalities                           Patient Education         Fluid Intake

## 2024-08-14 ENCOUNTER — OFFICE VISIT (OUTPATIENT)
Dept: FAMILY MEDICINE CLINIC | Facility: CLINIC | Age: 59
End: 2024-08-14
Payer: COMMERCIAL

## 2024-08-14 VITALS
WEIGHT: 151 LBS | TEMPERATURE: 97.6 F | HEART RATE: 64 BPM | SYSTOLIC BLOOD PRESSURE: 118 MMHG | HEIGHT: 64 IN | OXYGEN SATURATION: 94 % | BODY MASS INDEX: 25.78 KG/M2 | DIASTOLIC BLOOD PRESSURE: 80 MMHG

## 2024-08-14 DIAGNOSIS — Z13.220 SCREENING FOR LIPID DISORDERS: ICD-10-CM

## 2024-08-14 DIAGNOSIS — R68.89 SUSPECTED LYME DISEASE: Primary | ICD-10-CM

## 2024-08-14 DIAGNOSIS — Z13.29 SCREENING FOR THYROID DISORDER: ICD-10-CM

## 2024-08-14 DIAGNOSIS — Z13.0 SCREENING FOR DEFICIENCY ANEMIA: ICD-10-CM

## 2024-08-14 PROCEDURE — 99213 OFFICE O/P EST LOW 20 MIN: CPT | Performed by: NURSE PRACTITIONER

## 2024-08-14 RX ORDER — CEFUROXIME AXETIL 500 MG/1
500 TABLET ORAL EVERY 12 HOURS SCHEDULED
Qty: 28 TABLET | Refills: 0 | Status: SHIPPED | OUTPATIENT
Start: 2024-08-14 | End: 2024-08-28

## 2024-08-14 NOTE — PROGRESS NOTES
Ambulatory Visit  Name: Char Winn      : 1965      MRN: [id number]  Encounter Provider: KADE Zavaleta  Encounter Date: 2024   Encounter department: [facility]    Assessment & Plan   1. Suspected Lyme disease  Assessment & Plan:  Ceftin twice daily for 14 days  Can check lyme labs  Orders:  -     cefuroxime (CEFTIN) 500 mg tablet; Take 1 tablet (500 mg total) by mouth every 12 (twelve) hours for 14 days  -     Lyme Total AB W Reflex to IGM/IGG; Future  -     Lyme Total AB W Reflex to IGM/IGG  2. Screening for thyroid disorder  -     TSH, 3rd generation with Free T4 reflex; Future  -     TSH, 3rd generation with Free T4 reflex  3. Screening for deficiency anemia  -     CBC and differential; Future  -     Comprehensive metabolic panel; Future  -     CBC and differential  -     Comprehensive metabolic panel  4. Screening for lipid disorders  -     Lipid panel; Future  -     Lipid panel       History of Present Illness     Here today for bulls eye rash on left shoulder. Suspects a tick bite. Has a red spot on her left shoulder area with a lump in the middle. Appeared 2 days ago        Review of Systems   Constitutional:  Negative for chills and fever.   HENT:  Negative for ear pain and sore throat.    Eyes:  Negative for pain and visual disturbance.   Respiratory:  Negative for cough and shortness of breath.    Cardiovascular:  Negative for chest pain, palpitations and leg swelling.   Gastrointestinal:  Negative for abdominal pain, blood in stool, constipation, diarrhea, nausea and vomiting.   Genitourinary:  Negative for dysuria and hematuria.   Musculoskeletal:  Positive for arthralgias. Negative for back pain.        Heel pain left     Skin:  Positive for rash. Negative for color change.   Neurological:  Negative for seizures and syncope.   Hematological: Negative.    Psychiatric/Behavioral:  Positive for sleep disturbance.    All other systems reviewed and are  "negative.      Objective     /80 (BP Location: Left arm, Patient Position: Sitting, Cuff Size: Standard)   Pulse 64   Temp 97.6 °F (36.4 °C) (Tympanic)   Ht 5' 4\" (1.626 m)   Wt 68.5 kg (151 lb)   SpO2 94%   BMI 25.92 kg/m²     Physical Exam  Vitals and nursing note reviewed.   Constitutional:       General: She is not in acute distress.     Appearance: Normal appearance. She is well-developed and normal weight.   HENT:      Head: Normocephalic and atraumatic.      Right Ear: Ear canal and external ear normal.      Left Ear: Ear canal and external ear normal.      Ears:      Comments: Bilateral TMs with scarring       Nose: Nose normal.      Mouth/Throat:      Mouth: Mucous membranes are moist.      Pharynx: Oropharynx is clear.   Eyes:      Extraocular Movements: Extraocular movements intact.      Conjunctiva/sclera: Conjunctivae normal.      Pupils: Pupils are equal, round, and reactive to light.   Neck:      Thyroid: No thyromegaly or thyroid tenderness.   Cardiovascular:      Rate and Rhythm: Normal rate and regular rhythm.      Pulses:           Radial pulses are 2+ on the right side and 2+ on the left side.      Heart sounds: Normal heart sounds. No murmur heard.  Pulmonary:      Effort: Pulmonary effort is normal. No respiratory distress.      Breath sounds: Normal breath sounds.   Abdominal:      General: Abdomen is flat. Bowel sounds are normal.      Palpations: Abdomen is soft.      Tenderness: There is no abdominal tenderness.   Musculoskeletal:      Cervical back: Neck supple.      Right lower leg: No edema.      Left lower leg: No edema.   Skin:     General: Skin is warm and dry.      Findings: Rash present.      Comments: Erythema migrans left shoulder bicep region   Neurological:      General: No focal deficit present.      Mental Status: She is alert and oriented to person, place, and time.   Psychiatric:         Mood and Affect: Mood normal.         Behavior: Behavior normal. "       Administrative Statements

## 2024-08-23 PROBLEM — R68.89 SUSPECTED LYME DISEASE: Status: ACTIVE | Noted: 2024-08-23

## 2024-08-27 ENCOUNTER — HOSPITAL ENCOUNTER (OUTPATIENT)
Dept: ULTRASOUND IMAGING | Facility: CLINIC | Age: 59
Discharge: HOME | End: 2024-08-27
Payer: COMMERCIAL

## 2024-08-27 ENCOUNTER — HOSPITAL ENCOUNTER (OUTPATIENT)
Dept: MAMMOGRAPHY | Facility: CLINIC | Age: 59
Discharge: HOME | End: 2024-08-27
Payer: COMMERCIAL

## 2024-08-27 VITALS — WEIGHT: 151 LBS | BODY MASS INDEX: 25.78 KG/M2 | HEIGHT: 64 IN

## 2024-08-27 DIAGNOSIS — R92.8 ABNORMAL FINDINGS ON DIAGNOSTIC IMAGING OF BREAST: ICD-10-CM

## 2024-08-27 PROCEDURE — 77066 DX MAMMO INCL CAD BI: CPT

## 2024-08-27 PROCEDURE — 76642 ULTRASOUND BREAST LIMITED: CPT

## 2024-08-27 PROCEDURE — G0279 TOMOSYNTHESIS, MAMMO: HCPCS

## 2024-08-28 ENCOUNTER — TELEPHONE (OUTPATIENT)
Dept: FAMILY MEDICINE CLINIC | Facility: CLINIC | Age: 59
End: 2024-08-28

## 2024-08-28 DIAGNOSIS — R92.8 ABNORMAL FINDINGS ON DIAGNOSTIC IMAGING OF BREAST: Primary | ICD-10-CM

## 2024-08-28 NOTE — RESULT ENCOUNTER NOTE
Your left breast shows no evidence of malignancy. Right breast showed a stable mass on right breast at the 1 oclock position as well as two benign appearing lymph nodes. They are recommending repeat US of right breast in 1 year and diagnostic mammogram in 1 year of both breasts for follow up

## 2024-08-28 NOTE — TELEPHONE ENCOUNTER
----- Message from KADE Christine sent at 8/28/2024  8:23 AM EDT -----  Your left breast shows no evidence of malignancy. Right breast showed a stable mass on right breast at the 1 oclock position as well as two benign appearing lymph nodes. They are recommending repeat US of right breast in 1 year and diagnostic mammogram in 1 year of both breasts for follow up

## 2024-12-26 ENCOUNTER — ANNUAL EXAM (OUTPATIENT)
Dept: OBGYN CLINIC | Facility: CLINIC | Age: 59
End: 2024-12-26
Payer: COMMERCIAL

## 2024-12-26 VITALS
HEIGHT: 64 IN | BODY MASS INDEX: 26.43 KG/M2 | DIASTOLIC BLOOD PRESSURE: 72 MMHG | SYSTOLIC BLOOD PRESSURE: 112 MMHG | WEIGHT: 154.8 LBS

## 2024-12-26 DIAGNOSIS — Z01.419 ENCOUNTER FOR ANNUAL ROUTINE GYNECOLOGICAL EXAMINATION: Primary | ICD-10-CM

## 2024-12-26 DIAGNOSIS — Z12.31 ENCOUNTER FOR SCREENING MAMMOGRAM FOR MALIGNANT NEOPLASM OF BREAST: ICD-10-CM

## 2024-12-26 DIAGNOSIS — N81.6 PELVIC RELAXATION DUE TO RECTOCELE: ICD-10-CM

## 2024-12-26 PROCEDURE — S0612 ANNUAL GYNECOLOGICAL EXAMINA: HCPCS | Performed by: OBSTETRICS & GYNECOLOGY

## 2024-12-26 NOTE — PROGRESS NOTES
Name: Char Winn      : 1965      MRN: [id number]  Encounter Provider: Samantha Baez DO  Encounter Date: 2024   Encounter department: [facility]  :  Assessment & Plan  Encounter for annual routine gynecological examination         Encounter for screening mammogram for malignant neoplasm of breast         Pelvic relaxation due to rectocele         Pap smear deferred due to low risk status.  Encourage self breast examination as well as calcium supplementation.  She is scheduled for bilateral diagnostic mammogram and breast ultrasound 1 year.  Reviewed colon cancer screening, up to date.  Reviewed symptomatic mild pelvic floor prolapse treatment options including pessary.  She has completed pelvic floor physical therapy with minimal improvement.  She will notify me if she is interested.  Continue follow-up with primary care.  Return to office in 1 year or as needed    History of Present Illness   HPI  Char Winn is a 59 y.o. female who presents     This is a pleasant 59-year-old female P3 ( x 1,  x 2, age 29, 28, 26) presents for her GYN exam.  She went through menopause at age 49.  She has never been on hormone replacement therapy.  She denies any vaginal bleeding or spotting.  No changes in bowel or bladder function (intermittent episodes of constipation).  She continues to complain of pelvic floor pressure.  She underwent pelvic floor physical therapy with minimal improvement.  She declines vaginal estrogen.    Monogamous relationship for over 33 years.  Pap smears have been normal.  Last Pap     Colonoscopy 2021 follow-up 5 years    Mammogram (2024) ordered bilateral diagnostic mammogram and right breast ultrasound 1 year for follow-up    Pap 2023    [facility],     History obtained from: patient    Review of Systems   Constitutional:  Negative for fatigue, fever and unexpected weight change.   Respiratory:  Negative for cough, chest  "tightness, shortness of breath and wheezing.    Cardiovascular: Negative.  Negative for chest pain and palpitations.   Gastrointestinal: Negative.  Negative for abdominal distention, abdominal pain, blood in stool, constipation, diarrhea, nausea and vomiting.   Genitourinary: Negative.  Negative for difficulty urinating, dyspareunia, dysuria, flank pain, frequency, genital sores, hematuria, pelvic pain, urgency, vaginal bleeding, vaginal discharge and vaginal pain.   Skin:  Negative for rash.     Current Outpatient Medications on File Prior to Visit   Medication Sig Dispense Refill    Ascorbic Acid (VITAMIN C PO) Take by mouth      LUTEIN PO Take by mouth      Omega-3 Fatty Acids (FISH OIL PO) Take by mouth      Multiple Vitamins-Minerals (VITAMIN-MINERAL SUPPLEMENT PO) Take by mouth (Patient not taking: Reported on 12/26/2024)      [DISCONTINUED] estradiol (ESTRACE VAGINAL) 0.1 mg/g vaginal cream Insert 0.5 g into the vagina 2 (two) times a week (Patient not taking: Reported on 12/26/2024) 42.5 g 0     No current facility-administered medications on file prior to visit.         Objective   /72   Ht 5' 4\" (1.626 m)   Wt 70.2 kg (154 lb 12.8 oz)   BMI 26.57 kg/m²      Physical Exam  Constitutional:       Appearance: Normal appearance. She is well-developed.   HENT:      Head: Normocephalic and atraumatic.   Cardiovascular:      Rate and Rhythm: Normal rate and regular rhythm.   Pulmonary:      Effort: Pulmonary effort is normal.      Breath sounds: Normal breath sounds.   Chest:   Breasts:     Right: No inverted nipple, mass, nipple discharge, skin change or tenderness.      Left: No inverted nipple, mass, nipple discharge, skin change or tenderness.   Abdominal:      General: Bowel sounds are normal. There is no distension.      Palpations: Abdomen is soft.      Tenderness: There is no abdominal tenderness. There is no guarding or rebound.   Genitourinary:     Labia:         Right: No rash, tenderness or " lesion.         Left: No rash, tenderness or lesion.       Vagina: Normal. No signs of injury. No vaginal discharge or tenderness.      Cervix: No cervical motion tenderness, discharge, friability, lesion or cervical bleeding.      Uterus: Not enlarged and not tender.       Adnexa:         Right: No mass, tenderness or fullness.          Left: No mass, tenderness or fullness.     Neurological:      Mental Status: She is alert.   Psychiatric:         Behavior: Behavior normal.       External genitalia is within normal limits.  The vagina is evident of slight estrogen deficiency.  She does have a grade 1/2 rectocele, grade 1 cystocele with Valsalva.    Administrative Statements   I have spent a total time of 30 minutes in caring for this patient on the day of the visit/encounter including Prognosis, Risks and benefits of tx options, Instructions for management, Impressions, Counseling / Coordination of care, Documenting in the medical record, Reviewing / ordering tests, medicine, procedures  , and Obtaining or reviewing history  .

## 2025-03-27 ENCOUNTER — OFFICE VISIT (OUTPATIENT)
Dept: FAMILY MEDICINE CLINIC | Facility: CLINIC | Age: 60
End: 2025-03-27
Payer: COMMERCIAL

## 2025-03-27 VITALS
OXYGEN SATURATION: 98 % | SYSTOLIC BLOOD PRESSURE: 116 MMHG | BODY MASS INDEX: 25.61 KG/M2 | HEART RATE: 64 BPM | TEMPERATURE: 96.3 F | HEIGHT: 64 IN | DIASTOLIC BLOOD PRESSURE: 84 MMHG | WEIGHT: 150 LBS

## 2025-03-27 DIAGNOSIS — M79.672 LEFT FOOT PAIN: ICD-10-CM

## 2025-03-27 DIAGNOSIS — M79.671 RIGHT FOOT PAIN: ICD-10-CM

## 2025-03-27 DIAGNOSIS — Z00.00 ANNUAL PHYSICAL EXAM: Primary | ICD-10-CM

## 2025-03-27 PROCEDURE — 99213 OFFICE O/P EST LOW 20 MIN: CPT | Performed by: NURSE PRACTITIONER

## 2025-03-27 PROCEDURE — 99396 PREV VISIT EST AGE 40-64: CPT | Performed by: NURSE PRACTITIONER

## 2025-03-27 NOTE — PATIENT INSTRUCTIONS
"Remember to schedule mammogram and US for August for 1 year follow up  Patient Education     Routine physical for adults   The Basics   Written by the doctors and editors at [facility]   What is a physical? -- A physical is a routine visit, or \"check-up,\" with your doctor. You might also hear it called a \"wellness visit\" or \"preventive visit.\"  During each visit, the doctor will:   Ask about your physical and mental health   Ask about your habits, behaviors, and lifestyle   Do an exam   Give you vaccines if needed   Talk to you about any medicines you take   Give advice about your health   Answer your questions  Getting regular check-ups is an important part of taking care of your health. It can help your doctor find and treat any problems you have. But it's also important for preventing health problems.  A routine physical is different from a \"sick visit.\" A sick visit is when you see a doctor because of a health concern or problem. Since physicals are scheduled ahead of time, you can think about what you want to ask the doctor.  How often should I get a physical? -- It depends on your age and health. In general, for people age 21 years and older:   If you are younger than 50 years, you might be able to get a physical every 3 years.   If you are 50 years or older, your doctor might recommend a physical every year.  If you have an ongoing health condition, like diabetes or high blood pressure, your doctor will probably want to see you more often.  What happens during a physical? -- In general, each visit will include:   Physical exam - The doctor or nurse will check your height, weight, heart rate, and blood pressure. They will also look at your eyes and ears. They will ask about how you are feeling and whether you have any symptoms that bother you.   Medicines - It's a good idea to bring a list of all the medicines you take to each doctor visit. Your doctor will talk to you about your medicines and answer any " "questions. Tell them if you are having any side effects that bother you. You should also tell them if you are having trouble paying for any of your medicines.   Habits and behaviors - This includes:   Your diet   Your exercise habits   Whether you smoke, drink alcohol, or use drugs   Whether you are sexually active   Whether you feel safe at home  Your doctor will talk to you about things you can do to improve your health and lower your risk of health problems. They will also offer help and support. For example, if you want to quit smoking, they can give you advice and might prescribe medicines. If you want to improve your diet or get more physical activity, they can help you with this, too.   Lab tests, if needed - The tests you get will depend on your age and situation. For example, your doctor might want to check your:   Cholesterol   Blood sugar   Iron level   Vaccines - The recommended vaccines will depend on your age, health, and what vaccines you already had. Vaccines are very important because they can prevent certain serious or deadly infections.   Discussion of screening - \"Screening\" means checking for diseases or other health problems before they cause symptoms. Your doctor can recommend screening based on your age, risk, and preferences. This might include tests to check for:   Cancer, such as breast, prostate, cervical, ovarian, colorectal, prostate, lung, or skin cancer   Sexually transmitted infections, such as chlamydia and gonorrhea   Mental health conditions like depression and anxiety  Your doctor will talk to you about the different types of screening tests. They can help you decide which screenings to have. They can also explain what the results might mean.   Answering questions - The physical is a good time to ask the doctor or nurse questions about your health. If needed, they can refer you to other doctors or specialists, too.  Adults older than 65 years often need other care, too. As you " get older, your doctor will talk to you about:   How to prevent falling at home   Hearing or vision tests   Memory testing   How to take your medicines safely   Making sure that you have the help and support you need at home  All topics are updated as new evidence becomes available and our peer review process is complete.  This topic retrieved from [employer] on: May 02, 2024.  Topic 048209 Version 1.0  Release: 32.4.3 - C32.122  © 2024 UpToDate, Inc. and/or its affiliates. All rights reserved.  Consumer Information Use and Disclaimer   Disclaimer: This generalized information is a limited summary of diagnosis, treatment, and/or medication information. It is not meant to be comprehensive and should be used as a tool to help the user understand and/or assess potential diagnostic and treatment options. It does NOT include all information about conditions, treatments, medications, side effects, or risks that may apply to a specific patient. It is not intended to be medical advice or a substitute for the medical advice, diagnosis, or treatment of a health care provider based on the health care provider's examination and assessment of a patient's specific and unique circumstances. Patients must speak with a health care provider for complete information about their health, medical questions, and treatment options, including any risks or benefits regarding use of medications. This information does not endorse any treatments or medications as safe, effective, or approved for treating a specific patient. UpToDate, Inc. and its affiliates disclaim any warranty or liability relating to this information or the use thereof.The use of this information is governed by the Terms of Use, available at https://www.wolterskluwer.com/en/know/clinical-effectiveness-terms. 2024© UpToDate, Inc. and its affiliates and/or licensors. All rights reserved.  Copyright   © 2024 UpToDate, Inc. and/or its affiliates. All rights reserved.

## 2025-03-27 NOTE — PROGRESS NOTES
Adult Annual Physical  Name: Char Winn      : 1965      MRN: [id number]  Encounter Provider: KADE Zavaleta  Encounter Date: 3/27/2025   Encounter department: [facility]    Assessment & Plan  Annual physical exam  Due for fasting labs  Up to date on mammogram and cervical cancer screenings       Left foot pain  Check xray  Orders:    XR foot 3+ vw left; Future    Right foot pain  Check xray  Orders:    XR foot 3+ vw right; Future    Preventive Screenings:  - Diabetes Screening: risks/benefits discussed and orders placed  - Cholesterol Screening: risks/benefits discussed and orders placed   - Hepatitis C screening: screening up-to-date   - Cervical cancer screening: screening up-to-date   - Breast cancer screening: screening up-to-date   - Colon cancer screening: screening up-to-date   - Lung cancer screening: screening not indicated     Counseling/Anticipatory Guidance:    - Dental health: discussed importance of regular tooth brushing, flossing, and dental visits.   - Sexual health: discussed sexually transmitted diseases, partner selection, use of condoms, avoidance of unintended pregnancy, and contraceptive alternatives.   - Diet: discussed recommendations for a healthy/well-balanced diet.   - Exercise: the importance of regular exercise/physical activity was discussed. Recommend exercise 3-5 times per week for at least 30 minutes.   - Injury prevention: discussed safety/seat belts, safety helmets, smoke detectors, carbon monoxide detectors, and smoking near bedding or upholstery.          History of Present Illness     Adult Annual Physical:  Patient presents for annual physical. Had seen podiatry in the past for left foot plantar fasiitis comes and goes, chronic- did PT, injections but would keep coming back- seems to manage with stretching    Then a year ago right foot started with pain in arch inside and left foot pain near joints near her toes. Living with it but  is impeding some of her quality of life  No swelling in her feet. Pain in her feet will move on right side up to big toe but stays mostly in the arch. Hasn't tried anything to help the symptoms, She typically doesn't have headaches or pain anywhere else so she doesn't take tylenol or motrin  She doesn't think left toe pain radiates at all.   Right foot seems to be more painful than left    She does have a history of smoking smoked 4 years only one weekends, no second hand smoke exposure as child. She is a teacher  She hasn't had blood work done since 2023, she doesn't want to complete labs until she has a day off from school.      Sister with stage 4 lung cancer newly diagnosed- and her first symptom of onset was pain in her feet, then diagnosed blood clots so she is concerned.     .     Diet and Physical Activity:  - Diet/Nutrition:. feels she eats pretty well, she will eat out on occasion home made meals usually  - Exercise:. She remains active with [employer], [employer]    Depression Screening:  - PHQ-2 Score: 0    General Health:  - Sleep: 7-8 hours of sleep on average. some nights better than other  - Hearing: normal hearing right ear and normal hearing left ear.  - Vision: vision problems, most recent eye exam < 1 year ago and wears glasses.  - Dental: regular dental visits.    /GYN Health:  - Follows with GYN: yes.   - Menopause: postmenopausal.   - Contraception:. last pap 2023      Review of Systems   Constitutional:  Negative for chills and fever.   HENT:  Negative for ear pain and sore throat.    Eyes:  Negative for pain and visual disturbance.   Respiratory:  Negative for cough and shortness of breath.    Cardiovascular:  Negative for chest pain, palpitations and leg swelling.   Gastrointestinal:  Negative for abdominal pain, blood in stool, constipation, diarrhea, nausea and vomiting.   Genitourinary:  Negative for dysuria and hematuria.   Musculoskeletal:  Positive for  "arthralgias. Negative for back pain.        Bilateral foot pain       Skin:  Negative for color change and rash.   Neurological:  Negative for seizures and syncope.   Hematological: Negative.    Psychiatric/Behavioral:  Positive for sleep disturbance.    All other systems reviewed and are negative.        Objective   /84 (BP Location: Left arm, Patient Position: Sitting, Cuff Size: Adult)   Pulse 64   Temp (!) 96.3 °F (35.7 °C) (Tympanic)   Ht 5' 4\" (1.626 m)   Wt 68 kg (150 lb)   SpO2 98%   BMI 25.75 kg/m²     Physical Exam  Vitals and nursing note reviewed.   Constitutional:       General: She is not in acute distress.     Appearance: Normal appearance. She is well-developed and normal weight.   HENT:      Head: Normocephalic and atraumatic.      Right Ear: Ear canal and external ear normal.      Left Ear: Ear canal and external ear normal.      Ears:      Comments: Bilateral TMs with scarring       Nose: Nose normal.      Mouth/Throat:      Mouth: Mucous membranes are moist.      Pharynx: Oropharynx is clear.   Eyes:      Extraocular Movements: Extraocular movements intact.      Conjunctiva/sclera: Conjunctivae normal.      Pupils: Pupils are equal, round, and reactive to light.   Neck:      Thyroid: No thyromegaly or thyroid tenderness.   Cardiovascular:      Rate and Rhythm: Normal rate and regular rhythm.      Pulses:           Radial pulses are 2+ on the right side and 2+ on the left side.      Heart sounds: Normal heart sounds. No murmur heard.  Pulmonary:      Effort: Pulmonary effort is normal. No respiratory distress.      Breath sounds: Normal breath sounds.   Abdominal:      General: Abdomen is flat. Bowel sounds are normal.      Palpations: Abdomen is soft.      Tenderness: There is no abdominal tenderness.   Musculoskeletal:      Cervical back: Neck supple.      Right lower leg: No edema.      Left lower leg: No edema.      Right foot: Normal range of motion and normal capillary refill. " Tenderness present. Normal pulse.      Left foot: Normal range of motion and normal capillary refill. Tenderness present. Normal pulse.        Feet:    Skin:     General: Skin is warm and dry.      Comments: Erythema migrans left shoulder bicep region   Neurological:      General: No focal deficit present.      Mental Status: She is alert and oriented to person, place, and time.   Psychiatric:         Mood and Affect: Mood normal.         Behavior: Behavior normal.

## 2025-04-08 ENCOUNTER — HOSPITAL ENCOUNTER (OUTPATIENT)
Dept: RADIOLOGY | Facility: HOSPITAL | Age: 60
Discharge: HOME | End: 2025-04-08
Payer: COMMERCIAL

## 2025-04-08 DIAGNOSIS — M79.671 RIGHT FOOT PAIN: ICD-10-CM

## 2025-04-08 DIAGNOSIS — M79.672 LEFT FOOT PAIN: ICD-10-CM

## 2025-04-08 PROCEDURE — 73630 X-RAY EXAM OF FOOT: CPT

## 2025-04-14 ENCOUNTER — RESULTS FOLLOW-UP (OUTPATIENT)
Dept: FAMILY MEDICINE CLINIC | Facility: CLINIC | Age: 60
End: 2025-04-14

## 2025-04-14 NOTE — TELEPHONE ENCOUNTER
----- Message from KADE Christine sent at 4/14/2025  8:40 AM EDT -----  Hi Char, The xrays of your feet show both feet have a heel spur. And your right foot has some arthritic changes at the big toe joint space. Recommend podiatry if pain is persisting or worsening

## 2025-04-14 NOTE — RESULT ENCOUNTER NOTE
Hi Char, The xrays of your feet show both feet have a heel spur. And your right foot has some arthritic changes at the big toe joint space. Recommend podiatry if pain is persisting or worsening

## 2025-05-05 ENCOUNTER — TELEPHONE (OUTPATIENT)
Age: 60
End: 2025-05-05

## 2025-05-05 NOTE — TELEPHONE ENCOUNTER
Patient called asking if her rescheduled 06/05/25 nurse visit appointment was for a blood drawl.  Please advise and return patient's call.

## 2025-06-05 ENCOUNTER — CLINICAL SUPPORT (OUTPATIENT)
Dept: FAMILY MEDICINE CLINIC | Facility: CLINIC | Age: 60
End: 2025-06-05
Payer: COMMERCIAL

## 2025-06-05 DIAGNOSIS — Z13.29 SCREENING FOR THYROID DISORDER: ICD-10-CM

## 2025-06-05 DIAGNOSIS — Z13.220 SCREENING FOR LIPID DISORDERS: ICD-10-CM

## 2025-06-05 DIAGNOSIS — R92.8 ABNORMAL FINDINGS ON DIAGNOSTIC IMAGING OF BREAST: ICD-10-CM

## 2025-06-05 DIAGNOSIS — Z13.0 SCREENING FOR DEFICIENCY ANEMIA: ICD-10-CM

## 2025-06-05 DIAGNOSIS — R68.89 SUSPECTED LYME DISEASE: ICD-10-CM

## 2025-06-05 PROCEDURE — 36415 COLL VENOUS BLD VENIPUNCTURE: CPT | Performed by: NURSE PRACTITIONER

## 2025-06-06 LAB
ALBUMIN SERPL-MCNC: 4.7 G/DL (ref 3.8–4.9)
ALP SERPL-CCNC: 111 IU/L (ref 44–121)
ALT SERPL-CCNC: 17 IU/L (ref 0–32)
AST SERPL-CCNC: 23 IU/L (ref 0–40)
B BURGDOR IGG+IGM SER QL IA: NEGATIVE
BASOPHILS # BLD AUTO: 0.1 X10E3/UL (ref 0–0.2)
BASOPHILS NFR BLD AUTO: 1 %
BILIRUB SERPL-MCNC: 0.7 MG/DL (ref 0–1.2)
BUN SERPL-MCNC: 9 MG/DL (ref 6–24)
BUN/CREAT SERPL: 14 (ref 9–23)
CALCIUM SERPL-MCNC: 9.5 MG/DL (ref 8.7–10.2)
CHLORIDE SERPL-SCNC: 104 MMOL/L (ref 96–106)
CHOLEST SERPL-MCNC: 223 MG/DL (ref 100–199)
CHOLEST/HDLC SERPL: 2.9 RATIO (ref 0–4.4)
CO2 SERPL-SCNC: 24 MMOL/L (ref 20–29)
CREAT SERPL-MCNC: 0.66 MG/DL (ref 0.57–1)
EGFR: 102 ML/MIN/1.73
EOSINOPHIL # BLD AUTO: 0.1 X10E3/UL (ref 0–0.4)
EOSINOPHIL NFR BLD AUTO: 3 %
ERYTHROCYTE [DISTWIDTH] IN BLOOD BY AUTOMATED COUNT: 12.7 % (ref 11.7–15.4)
GLOBULIN SER-MCNC: 2.3 G/DL (ref 1.5–4.5)
GLUCOSE SERPL-MCNC: 84 MG/DL (ref 70–99)
HCT VFR BLD AUTO: 39.9 % (ref 34–46.6)
HDLC SERPL-MCNC: 78 MG/DL
HGB BLD-MCNC: 13.4 G/DL (ref 11.1–15.9)
IMM GRANULOCYTES # BLD: 0 X10E3/UL (ref 0–0.1)
IMM GRANULOCYTES NFR BLD: 0 %
LDLC SERPL CALC-MCNC: 136 MG/DL (ref 0–99)
LYMPHOCYTES # BLD AUTO: 1.8 X10E3/UL (ref 0.7–3.1)
LYMPHOCYTES NFR BLD AUTO: 32 %
MCH RBC QN AUTO: 31.3 PG (ref 26.6–33)
MCHC RBC AUTO-ENTMCNC: 33.6 G/DL (ref 31.5–35.7)
MCV RBC AUTO: 93 FL (ref 79–97)
MONOCYTES # BLD AUTO: 0.3 X10E3/UL (ref 0.1–0.9)
MONOCYTES NFR BLD AUTO: 5 %
NEUTROPHILS # BLD AUTO: 3.3 X10E3/UL (ref 1.4–7)
NEUTROPHILS NFR BLD AUTO: 59 %
PLATELET # BLD AUTO: 324 X10E3/UL (ref 150–450)
POTASSIUM SERPL-SCNC: 4.5 MMOL/L (ref 3.5–5.2)
PROT SERPL-MCNC: 7 G/DL (ref 6–8.5)
RBC # BLD AUTO: 4.28 X10E6/UL (ref 3.77–5.28)
SL AMB VLDL CHOLESTEROL CALC: 9 MG/DL (ref 5–40)
SODIUM SERPL-SCNC: 143 MMOL/L (ref 134–144)
TRIGL SERPL-MCNC: 51 MG/DL (ref 0–149)
TSH SERPL DL<=0.005 MIU/L-ACNC: 1.64 UIU/ML (ref 0.45–4.5)
WBC # BLD AUTO: 5.5 X10E3/UL (ref 3.4–10.8)

## 2025-06-08 ENCOUNTER — RESULTS FOLLOW-UP (OUTPATIENT)
Dept: FAMILY MEDICINE CLINIC | Facility: CLINIC | Age: 60
End: 2025-06-08